# Patient Record
Sex: FEMALE | Race: WHITE | NOT HISPANIC OR LATINO | Employment: FULL TIME | URBAN - NONMETROPOLITAN AREA
[De-identification: names, ages, dates, MRNs, and addresses within clinical notes are randomized per-mention and may not be internally consistent; named-entity substitution may affect disease eponyms.]

---

## 2017-02-10 ENCOUNTER — COMMUNICATION - GICH (OUTPATIENT)
Dept: FAMILY MEDICINE | Facility: OTHER | Age: 62
End: 2017-02-10

## 2017-02-10 DIAGNOSIS — E78.00 PURE HYPERCHOLESTEROLEMIA: ICD-10-CM

## 2017-04-05 ENCOUNTER — OFFICE VISIT - GICH (OUTPATIENT)
Dept: FAMILY MEDICINE | Facility: OTHER | Age: 62
End: 2017-04-05

## 2017-04-05 ENCOUNTER — HOSPITAL ENCOUNTER (OUTPATIENT)
Dept: RADIOLOGY | Facility: OTHER | Age: 62
End: 2017-04-05
Attending: NURSE PRACTITIONER

## 2017-04-05 ENCOUNTER — HISTORY (OUTPATIENT)
Dept: FAMILY MEDICINE | Facility: OTHER | Age: 62
End: 2017-04-05

## 2017-04-05 DIAGNOSIS — M54.50 LOW BACK PAIN: ICD-10-CM

## 2017-04-05 DIAGNOSIS — M43.10 SPONDYLOLISTHESIS: ICD-10-CM

## 2017-04-05 DIAGNOSIS — M43.00 SPONDYLOLYSIS: ICD-10-CM

## 2017-04-05 DIAGNOSIS — M46.1 SACROILIITIS, NOT ELSEWHERE CLASSIFIED (H): ICD-10-CM

## 2017-04-20 ENCOUNTER — HISTORY (OUTPATIENT)
Dept: FAMILY MEDICINE | Facility: OTHER | Age: 62
End: 2017-04-20

## 2017-04-20 ENCOUNTER — OFFICE VISIT - GICH (OUTPATIENT)
Dept: FAMILY MEDICINE | Facility: OTHER | Age: 62
End: 2017-04-20

## 2017-04-20 DIAGNOSIS — E66.01 MORBID (SEVERE) OBESITY DUE TO EXCESS CALORIES (H): ICD-10-CM

## 2017-04-20 DIAGNOSIS — K64.5 PERIANAL VENOUS THROMBOSIS: ICD-10-CM

## 2017-04-20 DIAGNOSIS — Z12.11 ENCOUNTER FOR SCREENING FOR MALIGNANT NEOPLASM OF COLON: ICD-10-CM

## 2017-04-25 ENCOUNTER — COMMUNICATION - GICH (OUTPATIENT)
Dept: FAMILY MEDICINE | Facility: OTHER | Age: 62
End: 2017-04-25

## 2017-04-25 DIAGNOSIS — L30.9 DERMATITIS: ICD-10-CM

## 2017-04-27 ENCOUNTER — COMMUNICATION - GICH (OUTPATIENT)
Dept: FAMILY MEDICINE | Facility: OTHER | Age: 62
End: 2017-04-27

## 2017-05-24 ENCOUNTER — COMMUNICATION - GICH (OUTPATIENT)
Dept: SURGERY | Facility: OTHER | Age: 62
End: 2017-05-24

## 2017-07-03 ENCOUNTER — HISTORY (OUTPATIENT)
Dept: INTERNAL MEDICINE | Facility: OTHER | Age: 62
End: 2017-07-03

## 2017-07-03 ENCOUNTER — OFFICE VISIT - GICH (OUTPATIENT)
Dept: INTERNAL MEDICINE | Facility: OTHER | Age: 62
End: 2017-07-03

## 2017-07-03 DIAGNOSIS — M53.3 SACROCOCCYGEAL DISORDERS, NOT ELSEWHERE CLASSIFIED: ICD-10-CM

## 2017-07-03 DIAGNOSIS — G89.29 OTHER CHRONIC PAIN: ICD-10-CM

## 2017-07-03 DIAGNOSIS — M54.50 LOW BACK PAIN: ICD-10-CM

## 2017-07-04 ENCOUNTER — COMMUNICATION - GICH (OUTPATIENT)
Dept: FAMILY MEDICINE | Facility: OTHER | Age: 62
End: 2017-07-04

## 2017-07-04 DIAGNOSIS — N39.41 URGE INCONTINENCE: ICD-10-CM

## 2017-07-06 ENCOUNTER — COMMUNICATION - GICH (OUTPATIENT)
Dept: FAMILY MEDICINE | Facility: OTHER | Age: 62
End: 2017-07-06

## 2017-07-06 DIAGNOSIS — N39.41 URGE INCONTINENCE: ICD-10-CM

## 2017-07-08 ENCOUNTER — COMMUNICATION - GICH (OUTPATIENT)
Dept: FAMILY MEDICINE | Facility: OTHER | Age: 62
End: 2017-07-08

## 2017-07-08 DIAGNOSIS — N39.41 URGE INCONTINENCE: ICD-10-CM

## 2017-07-11 ENCOUNTER — COMMUNICATION - GICH (OUTPATIENT)
Dept: FAMILY MEDICINE | Facility: OTHER | Age: 62
End: 2017-07-11

## 2017-07-11 DIAGNOSIS — N39.41 URGE INCONTINENCE: ICD-10-CM

## 2017-07-26 ENCOUNTER — HISTORY (OUTPATIENT)
Dept: FAMILY MEDICINE | Facility: OTHER | Age: 62
End: 2017-07-26

## 2017-07-26 ENCOUNTER — OFFICE VISIT - GICH (OUTPATIENT)
Dept: FAMILY MEDICINE | Facility: OTHER | Age: 62
End: 2017-07-26

## 2017-07-26 DIAGNOSIS — L85.3 XEROSIS CUTIS: ICD-10-CM

## 2017-07-26 DIAGNOSIS — N39.41 URGE INCONTINENCE: ICD-10-CM

## 2017-07-26 DIAGNOSIS — E78.00 PURE HYPERCHOLESTEROLEMIA: ICD-10-CM

## 2017-07-26 DIAGNOSIS — Z12.39 ENCOUNTER FOR OTHER SCREENING FOR MALIGNANT NEOPLASM OF BREAST: ICD-10-CM

## 2017-07-26 LAB
A/G RATIO - HISTORICAL: 1.4 (ref 1–2)
ABSOLUTE BASOPHILS - HISTORICAL: 0 THOU/CU MM
ABSOLUTE EOSINOPHILS - HISTORICAL: 0.1 THOU/CU MM
ABSOLUTE IMMATURE GRANULOCYTES(METAS,MYELOS,PROS) - HISTORICAL: 0 THOU/CU MM
ABSOLUTE LYMPHOCYTES - HISTORICAL: 1.6 THOU/CU MM (ref 0.9–2.9)
ABSOLUTE MONOCYTES - HISTORICAL: 0.5 THOU/CU MM
ABSOLUTE NEUTROPHILS - HISTORICAL: 4.7 THOU/CU MM (ref 1.7–7)
ALBUMIN SERPL-MCNC: 4.2 G/DL (ref 3.5–5.7)
ALP SERPL-CCNC: 126 IU/L (ref 34–104)
ALT (SGPT) - HISTORICAL: 20 IU/L (ref 7–52)
ANION GAP - HISTORICAL: 8 (ref 5–18)
AST SERPL-CCNC: 22 IU/L (ref 13–39)
BASOPHILS # BLD AUTO: 0.6 %
BILIRUB SERPL-MCNC: 1.2 MG/DL (ref 0.3–1)
BILIRUB UR QL: NEGATIVE
BUN SERPL-MCNC: 13 MG/DL (ref 7–25)
BUN/CREAT RATIO - HISTORICAL: 17
CALCIUM SERPL-MCNC: 9.2 MG/DL (ref 8.6–10.3)
CHLORIDE SERPLBLD-SCNC: 107 MMOL/L (ref 98–107)
CHOL/HDL RATIO - HISTORICAL: 2.8
CHOLESTEROL TOTAL: 157 MG/DL
CLARITY, URINE: CLEAR CLARITY
CO2 SERPL-SCNC: 27 MMOL/L (ref 21–31)
COLOR UR: YELLOW COLOR
CREAT SERPL-MCNC: 0.77 MG/DL (ref 0.7–1.3)
EOSINOPHIL NFR BLD AUTO: 1.1 %
ERYTHROCYTE [DISTWIDTH] IN BLOOD BY AUTOMATED COUNT: 13.8 % (ref 11.5–15.5)
GFR IF NOT AFRICAN AMERICAN - HISTORICAL: >60 ML/MIN/1.73M2
GLOBULIN - HISTORICAL: 2.9 G/DL (ref 2–3.7)
GLUCOSE SERPL-MCNC: 104 MG/DL (ref 70–105)
GLUCOSE URINE: NEGATIVE MG/DL
HCT VFR BLD AUTO: 42.6 % (ref 33–51)
HDLC SERPL-MCNC: 56 MG/DL (ref 23–92)
HEMOGLOBIN: 14 G/DL (ref 12–16)
IMMATURE GRANULOCYTES(METAS,MYELOS,PROS) - HISTORICAL: 0.1 %
KETONES UR QL: NEGATIVE MG/DL
LDLC SERPL CALC-MCNC: 86 MG/DL
LEUKOCYTE ESTERASE URINE: NEGATIVE
LYMPHOCYTES NFR BLD AUTO: 22.9 % (ref 20–44)
MCH RBC QN AUTO: 28.5 PG (ref 26–34)
MCHC RBC AUTO-ENTMCNC: 32.9 G/DL (ref 32–36)
MCV RBC AUTO: 87 FL (ref 80–100)
MONOCYTES NFR BLD AUTO: 7.7 %
NEUTROPHILS NFR BLD AUTO: 67.6 % (ref 42–72)
NITRITE UR QL STRIP: NEGATIVE
NON-HDL CHOLESTEROL - HISTORICAL: 101 MG/DL
OCCULT BLOOD,URINE - HISTORICAL: NEGATIVE
PATIENT STATUS - HISTORICAL: NORMAL
PH UR: 7.5 [PH]
PLATELET # BLD AUTO: 243 THOU/CU MM (ref 140–440)
PMV BLD: 10.9 FL (ref 6.5–11)
POTASSIUM SERPL-SCNC: 3.9 MMOL/L (ref 3.5–5.1)
PROT SERPL-MCNC: 7.1 G/DL (ref 6.4–8.9)
PROTEIN QUALITATIVE,URINE - HISTORICAL: NEGATIVE MG/DL
RED BLOOD COUNT - HISTORICAL: 4.92 MIL/CU MM (ref 4–5.2)
SODIUM SERPL-SCNC: 142 MMOL/L (ref 133–143)
SP GR UR STRIP: 1.01
TRIGL SERPL-MCNC: 74 MG/DL
TSH - HISTORICAL: 3.09 UIU/ML (ref 0.34–5.6)
UROBILINOGEN,QUALITATIVE - HISTORICAL: NORMAL EU/DL
WHITE BLOOD COUNT - HISTORICAL: 7 THOU/CU MM (ref 4.5–11)

## 2017-07-26 ASSESSMENT — ANXIETY QUESTIONNAIRES
5. BEING SO RESTLESS THAT IT IS HARD TO SIT STILL: NOT AT ALL
3. WORRYING TOO MUCH ABOUT DIFFERENT THINGS: NOT AT ALL
GAD7 TOTAL SCORE: 1
4. TROUBLE RELAXING: NOT AT ALL
7. FEELING AFRAID AS IF SOMETHING AWFUL MIGHT HAPPEN: NOT AT ALL
2. NOT BEING ABLE TO STOP OR CONTROL WORRYING: SEVERAL DAYS
6. BECOMING EASILY ANNOYED OR IRRITABLE: NOT AT ALL
1. FEELING NERVOUS, ANXIOUS, OR ON EDGE: NOT AT ALL

## 2017-12-28 NOTE — TELEPHONE ENCOUNTER
Patient Information     Patient Name MRN Sex Lavonne Sullivan [id number] Female 1955      Telephone Encounter by Selina Minaya RN at 2017  8:57 AM     Author:  Selina Minaya RN Service:  (none) Author Type:  NURS- Registered Nurse     Filed:  2017  8:57 AM Encounter Date:  2017 Status:  Signed     :  Selina Minaya RN (NURS- Registered Nurse)            Request already responded to.    Selina Minaya RN ....................  2017   8:57 AM

## 2017-12-28 NOTE — TELEPHONE ENCOUNTER
Patient Information     Patient Name MRN Lavonne Nava [id number] Female 1955      Telephone Encounter by Pia Celeste RN at 2017 10:57 AM     Author:  Pia Celeste RN Service:  (none) Author Type:  NURS- Registered Nurse     Filed:  2017 10:57 AM Encounter Date:  2017 Status:  Signed     :  Pia Celeste RN (NURS- Registered Nurse)            Refilled 17 for 90 days.  Unable to complete prescription refill per RN Medication Refill Policy.................... PIA CELESTE RN ....................  2017   10:57 AM

## 2017-12-28 NOTE — TELEPHONE ENCOUNTER
Patient Information     Patient Name MRN Lavonne Nava [id number] Female 1955      Telephone Encounter by Pia Celeste RN at 2017  3:29 PM     Author:  Pia Celeste RN Service:  (none) Author Type:  NURS- Registered Nurse     Filed:  2017  3:30 PM Encounter Date:  2017 Status:  Signed     :  Pia Celeste RN (NURS- Registered Nurse)            Refilled 17, #90.  Unable to complete prescription refill per RN Medication Refill Policy.................... PIA CELESTE RN ....................  2017   3:30 PM

## 2017-12-28 NOTE — PROGRESS NOTES
"Patient Information     Patient Name MRN Lavonne Nava [id number] Female 1955      Progress Notes by Leslie Snow DO at 7/3/2017  2:40 PM     Author:  Leslie Snow DO Service:  (none) Author Type:  PHYS- Osteopathic     Filed:  7/3/2017  5:19 PM Encounter Date:  7/3/2017 Status:  Signed     :  Leslie Snow DO (PHYS- Osteopathic)            Chief Complaint     Patient presents with       Back Pain      left lower back pain x 1 week        Subjective:   Ms. Barrios is a 61 y.o. female  seen for the acute concern today of acute low back pain.  She states that this started one week ago.  She denies any acute trauma.  She was bending over for a while doing some work and noted when she stood up that she has significant discomfort along the left lumbar side.  She denies any significant sciatica.  She did have something similar a few months back and did have an x-ray.  We did review this together today.  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  Since she developed her back pain she has been putting some Biofreeze on it with minimal improvement.  She also has been taking some \"arthritis pills\"although is unsure what these are.  She reports that she is taking 2 of them every 4 hours.    She  reports that she has never smoked. She has never used smokeless tobacco.    Past medical history reviewed as below:     Past Medical History:     Diagnosis  Date     Impingement syndrome of right shoulder      Morbid obesity (HC)     BMI 42.5, based off height 67 inches, abdominal girth greater than 35. patient was counseled on obesity.       Skin rash     Recurrent      Thrombosed hemorrhoids    .      ROS:   Pertinent ROS was performed and was negative, including for fevers, chills, chest pain, shortness of breath, changes in bowel or bladder or new bowel or bladder incontinence. No other concerns, with exception of HPI above.      Objective:    /72  Pulse 64  Temp " "98.6  F (37  C) (Tympanic)  Resp 20  Ht 1.676 m (5' 6\")  Wt 127.5 kg (281 lb 2 oz)  Breastfeeding? No  BMI 45.37 kg/m2  GEN: Vitals reviewed.  Patient is in no acute distress. Cooperative with exam.  SKIN: Warm and dry to touch.  No rash on face, arms and legs.  EXT: No clubbing or cyanosis.  Trace bilateral lower ext peripheral edema.  MSK:  Gait is normal.  Patient is stiff with going from a sitting to standing position.  ROM of lumbar flexors and extensors is intact.  ROM with rotation and side bending is intact.  Pain to palpation of paraspinal muscles on left lumbar region.  No pain to palpation of spine directly.  BUE and BLE sensation is intact.  Notable muscle spasm and hypertrophy in the left lumbar paraspinal muscles.  BUE and BLE muscle strength intact.       Assessment/Plan:   1. Acute left-sided low back pain without sciatica  - Ice and/or heat, elevation, gentle movement/rest as tolerated  - Ibuprofen, Naproxen or Tylenol as needed  - exercises given, PT to be offered if she does not have improvement in the next week or two  - note given for work.  - If she does continue to have significant pain despite conservative treatment we may consider doing an MRI sooner than later due to her history of spondylolisthesis.  - patient is to call if she has additional problems with this or if new symptoms develop    2. Chronic SI joint pain  - exercises given specifically for SI joint pain.  We did discuss that if this continues we may need to consider injection.      Return if symptoms worsen or fail to improve.    Patient Instructions   Ice and/or heat every 3-4 hours, gentle exercise/rest as much as possible.    Recommend Ibuprofen 200 mg tablet (3 tablets) 3-4 times daily for 5-7 days and then as needed.  Be sure to take with food.  Maximum 16 per day.    Or     Naproxen (Aleve) 220 mg tablet (1-2 tablets) 1-2 times daily for 5-7 days and then as needed.  Be sure to take with food.  Max of 4 per " day.    Caution with NSAIDS (ibuprofen, aspirin, naproxen, aleve, advil) due to risk for increased blood pressure, stomach pain/nausea/ulcers and kidney damage; use minimal amount necessary    -- in addition to --     Tylenol 1000mg (2- extra strength 500mg tablets or 3 regular strength 325mg tablets) three times a day; Maximum of 4000mg daily    - Return/call as needed for follow-up should any new symptoms develop, for worsening of current symptoms or if symptoms do not resolve with above plan.       Index Romansh   Low Back Pain Exercises   Exercises that stretch and strengthen the muscles of your abdomen and spine can help prevent back problems. Strong back and abdominal muscles help you keep good posture, with your spine in its correct position.  If your muscles are tight, take a warm shower or bath before doing the exercises. Exercise on a rug or mat. Wear loose clothing. Don t wear shoes. Stop doing any exercise that causes pain until you have talked with your healthcare provider.  Ask your provider or physical therapist to help you develop an exercise program. Ask your provider how many times a week you need to do the exercises. Remember to start slowly.   Exercises  These exercises are intended only as suggestions. Be sure to check with your provider before starting the exercises.     Abdominal drawing-in maneuver: Lie on your back with your knees bent and your feet flat on the floor. Try to pull your belly button in towards your spine. Hold this position for 15 seconds and then relax. Repeat 5 to 10 times.    Cat and camel: Get down on your hands and knees. Let your stomach sag, allowing your back to curve downward. Hold this position for 5 seconds. Then arch your back and hold for 5 seconds. Do 2 sets of 15.    Quadruped arm and leg raise: Get down on your hands and knees. Pull in your belly button and tighten your abdominal muscles to stiffen your spine. While keeping your abdominals tight, raise one arm  and the opposite leg away from you. Hold this position for 5 seconds. Lower your arm and leg slowly and change sides. Do this 10 times on each side.    Pelvic tilt: Lie on your back with your knees bent and your feet flat on the floor. Pull your belly button in towards your spine and push your lower back into the floor, flattening your back. Hold this position for 15 seconds, then relax. Repeat 5 to 10 times.    Partial curl: Lie on your back with your knees bent and your feet flat on the floor. Draw in your abdomen and tighten your stomach muscles. With your hands stretched out in front of you, curl your upper body forward until your shoulders clear the floor. Hold this position for 3 seconds. Don't hold your breath. It helps to breathe out as you lift your shoulders. Relax back to the floor. Repeat 10 times. Build to 2 sets of 15. To challenge yourself, clasp your hands behind your head and keep your elbows out to your sides.    Gluteal stretch: Lie on your back with both knees bent. Rest your right ankle over the knee of your left leg. Grasp the thigh of the left leg and pull toward your chest. You will feel a stretch along the buttocks and possibly along the outside of your hip. Hold the stretch for 15 to 30 seconds. Then repeat the exercise with your left ankle over your right knee. Do the exercise 3 times with each leg.    Extension exercise  1. Lie face down on the floor for 5 minutes. If this hurts too much, lie face down with a pillow under your stomach. This should relieve your leg or back pain. When you can lie on your stomach for 5 minutes without a pillow, you can continue with Part B of this exercise.  2. After lying on your stomach for 5 minutes, prop yourself up on your elbows for another 5 minutes. If you can do this without having more leg or buttock pain, you can start doing part C of this exercise.  3. Lie on your stomach with your hands under your shoulders. Then press down on your hands and  extend your elbows while keeping your hips flat on the floor. Hold for 1 second and lower yourself to the floor. Do 3 to 5 sets of 10 repetitions. Rest for 1 minute between sets. You should have no pain in your legs when you do this, but it is normal to feel some pain in your lower back.  Do this exercise several times a day.    Side plank: Lie on your side with your legs, hips, and shoulders in a straight line. Prop yourself up onto your forearm with your elbow directly under your shoulder. Lift your hips off the floor and balance on your forearm and the outside of your foot. Try to hold this position for 15 seconds and then slowly lower your hip to the ground. Switch sides and repeat. Work up to holding for 1 minute. This exercise can be made easier by starting with your knees and hips flexed toward your chest.    Prone plank: Lie on your stomach on the floor with your elbows bent and your forearms resting on the floor. Lift your hips and knees off the floor and try to stay in this position while keeping your back flat. Work up to holding this position for at least 1 minute. Do 3 sets.  Exercises to avoid   It s best to avoid the following exercises because they strain the lower back:    Exercises in which you lie on your back and raise and lower both legs together    Full sit-ups or sit-ups with straight legs    Hip twists    Squats with weights  Sports and other activities   In addition to strengthening your back muscles, it s helpful to keep your entire body in shape. Good activities for people with back problems include:    Walking    Bicycling    Swimming    Cross-country skiing    Yoga    Dennis Chi    Pilates  Some sports can hurt your back because of rough contact, twisting, sudden impact, or direct stress on your back. Sports that may be dangerous to your back include:    Basketball    Football    Soccer    Volleyball    Handball    Golf    Weight  lifting    Trampoline    Tobogganing    Sledding    Snowmobiling    Snowboarding    Ice hockey  Developed by [employer].  Adult Advisor 2016.3 published by [employer].  Last modified: 2016-05-26  Last reviewed: 2016-05-18  This content is reviewed periodically and is subject to change as new health information becomes available. The information is intended to inform and educate and is not a replacement for medical evaluation, advice, diagnosis or treatment by a healthcare professional.  References   Adult Advisor 2016.3 Index    Copyright   2016 [employer], a division of [employer] All rights reserved.            Index Pakistani   Sacroiliac Joint Pain Exercises   Your healthcare provider may recommend exercises to help you heal. Talk to your healthcare provider or physical therapist about which exercises will best help you and how to do them correctly and safely.  These exercises are designed to gently move your sacroiliac joint. Do not do these exercises if they cause any pain or discomfort. If you keep having pain, see your healthcare provider or physical therapist as soon as possible.    Abdominal drawing-in maneuver: Lie on your back with your knees bent and your feet flat on the floor. Try to pull your belly button in towards your spine. Hold this position for 15 seconds and then relax. Repeat 5 to 10 times.    Hamstring stretch on wall: Lie on your back with your buttocks close to a doorway. Stretch your uninjured leg straight out in front of you on the floor through the doorway. Raise your injured leg and rest it against the wall next to the door frame. Keep your leg as straight as possible. You should feel a stretch in the back of your thigh. Hold this position for 15 to 30 seconds. Repeat 3 times.    Hip flexor stretch: Kneel on one leg with your other leg in front of you at a 90 degree angle (like a lunge). Keep that your foot flat on the floor. Keep your lower back straight and lean your  hips forward slightly until you feel a stretch at the front of your hip. Try not to bend forward as you do this. Hold this position for 15 to 30 seconds. Repeat 3 times with each leg.    Hip adductor stretch: Lie on your back. Bend your knees and put your feet flat on the floor. Gently spread your knees apart, stretching the muscles on the inside of your thighs. Hold the stretch for 15 to 30 seconds. Repeat 3 times.    Isometric hip adduction: Sit with your knees bent 90 degrees with a pillow placed between your knees and your feet flat on the floor. Squeeze the pillow for 5 seconds and then relax. Do 2 sets of 10.    Gluteal Sets: Lie on your stomach with your legs straight out behind you. Squeeze your buttock muscles together and hold for 5 seconds. Relax. Do 2 sets of 10.    Lower trunk rotation: Lie on your back with your knees bent and your feet flat on the floor. Tighten your stomach muscles and push your lower back into the floor. Keeping your shoulders down flat, gently rotate your legs to one side as far as you can. Then rotate your legs to the other side. Repeat 10 to 20 times.    Single knee to chest stretch: Lie on your back with your legs straight out in front of you. Bring one knee up to your chest and grasp the back of your thigh. Pull your knee toward your chest, stretching your buttock muscle. Hold this position for 15 to 30 seconds and then return to the starting position. Repeat 3 times on each side.    Double knee to chest: Lie on your back with your knees bent and your feet flat on the floor. Tighten your stomach muscles and push your lower back into the floor. Pull both knees up to your chest. Hold for 5 seconds. Relax and then repeat 3 times.    Resisted hip extension: Stand facing a door with elastic tubing tied around the ankle of your injured side. Knot the other end of the tubing and shut the knot in the door near the floor. Draw your abdomen in towards your spine and tighten your  abdominal muscles. Pull the leg with the tubing straight back, keeping your leg straight. Make sure you do not lean forward. Return to the starting position. Do 2 sets of 10.    Clam exercise: Lie on your uninjured side with your hips and knees bent and feet together. Slowly raise your top leg toward the ceiling while keeping your heels touching each other. Hold for 2 seconds and lower slowly. Do 2 sets of 15 repetitions.  Developed by [employer].  Adult Advisor 2016.3 published by [employer].  Last modified: 2016-04-25  Last reviewed: 2016-04-25  This content is reviewed periodically and is subject to change as new health information becomes available. The information is intended to inform and educate and is not a replacement for medical evaluation, advice, diagnosis or treatment by a healthcare professional.  References   Adult Advisor 2016.3 Index    Copyright   2016 [employer], a division of [employer] All rights reserved.              NAHED PERDOMO DO   7/3/2017 5:13 PM    This document was prepared using voice generated softwear. While every attempt was made for accuracy, grammatical errors may exist.

## 2017-12-28 NOTE — PROGRESS NOTES
Patient Information     Patient Name MRN Lavonne Nava [id number] Female 1955      Progress Notes by Sarah Albright MD at 2017  9:30 AM     Author:  Sarah Albright MD Service:  (none) Author Type:  Physician     Filed:  2017  8:54 PM Encounter Date:  2017 Status:  Signed     :  Sarah Albright MD (Physician)            Nursing Notes:   Juancarlos Navarrete LPN  2017  9:33 AM  Signed  Patient presents to the clinic for  medication management.  Juancarlos Navarrete LPN ..............2017 9:33 AM         Subjective:  Lavonne Barrios is a 61 y.o. female who presents for  Multiple issues     URINARY INCONTINENCE, URGE   patient with a history of mixed incontinence.  More stress but does say that pop causes bladder urgency.  When she takes ditropan it is improved.  She continues to use bladder irritants.   She denies any fever, chills.  No blood in urine       Pure hypercholesterolemia   on Lipitor 80 mg a day.  She is having fatigue and muscle aches.   She denies any chest pain, palpitations, she has edema by the end of the day where she can see creases from her socks.          Screening for breast cancer  Patient declines breast exam.  She is willing to have mammogram.          Dry skin  She notes her skin, hair and nails are dry.  She is under a lot of stress.   She has family members with thyroid problems.   - TSH; Future  - TSH    Situational stress  Patient cares for her daughter ( late 30's) with MS.  She struggles with finding life balance as well as worries about her all the time, especially ' once I'm gone. '.       PHQ Depression Screen  Date of PHQ exam: 17  Over the last 2 weeks, how often have you been bothered by any of the following problems?  1. Little interest or pleasure in doing things: 0 - Not at all  2. Feeling down, depressed, or hopeless: 0 - Not at all                                         No Known Allergies  No  "current outpatient prescriptions on file prior to visit.     No current facility-administered medications on file prior to visit.        Problem List/PMH: reviewed in EMR    Social Hx:  Social History       Substance Use Topics         Smoking status:   Never Smoker     Smokeless tobacco:   Never Used     Alcohol use   No      Comment: [facility]      Social History Narrative    . nonsmoker.        Family Hx:   Family History       Problem   Relation Age of Onset     Heart Disease  Mother      Cancer  Father      Melanoma, family hx       Cancer-pancreatic  Other      Family hx       Other  Daughter      MS       Cancer-breast  No Family History        Objective:  /80  Pulse 56  Ht 1.676 m (5' 6\")  Wt 128.8 kg (284 lb)  BMI 45.84 kg/m2   Patient is alert and oriented times three.  She is mood is sad, slightly depressed.  Affect congruent.  She has good judgement and insight. Casually groomed.  Obese.  Patient appears tired.   SHEREE. TM's clear, Oral pharynx with good dentition, without lesion, erythema or exudate. Moist mucous membranes. Neck supple and free of adenopathy, or masses. No thyromegaly. Lungs are clear, without wheezes, rhonchi or rales. Heart sounds are normal, no murmurs, clicks, gallops or rubs. Abdomen is soft, obese no tenderness, masses or organomegaly. No CVAT.  Extremities are without edema. Peripheral pulses are normal. Screening neurological exam is normal without focal findings. Skin is dry.       Results for orders placed or performed in visit on 07/26/17      COMP METABOLIC PANEL      Result  Value Ref Range    SODIUM 142 133 - 143 mmol/L    POTASSIUM 3.9 3.5 - 5.1 mmol/L    CHLORIDE 107 98 - 107 mmol/L    CO2,TOTAL 27 21 - 31 mmol/L    ANION GAP 8 5 - 18                    GLUCOSE 104 70 - 105 mg/dL    CALCIUM 9.2 8.6 - 10.3 mg/dL    BUN 13 7 - 25 mg/dL    CREATININE 0.77 0.70 - 1.30 mg/dL    BUN/CREAT RATIO           17                    GFR if  >60 >60 " ml/min/1.73m2    GFR if not African American >60 >60 ml/min/1.73m2    ALBUMIN 4.2 3.5 - 5.7 g/dL    PROTEIN,TOTAL 7.1 6.4 - 8.9 g/dL    GLOBULIN                  2.9 2.0 - 3.7 g/dL    A/G RATIO 1.4 1.0 - 2.0                    BILIRUBIN,TOTAL 1.2 (H) 0.3 - 1.0 mg/dL    ALK PHOSPHATASE 126 (H) 34 - 104 IU/L    ALT (SGPT) 20 7 - 52 IU/L    AST (SGOT) 22 13 - 39 IU/L   TSH      Result  Value Ref Range    TSH 3.09 0.34 - 5.60 uIU/mL   CBC WITH AUTO DIFFERENTIAL      Result  Value Ref Range    WHITE BLOOD COUNT         7.0 4.5 - 11.0 thou/cu mm    RED BLOOD COUNT           4.92 4.00 - 5.20 mil/cu mm    HEMOGLOBIN                14.0 12.0 - 16.0 g/dL    HEMATOCRIT                42.6 33.0 - 51.0 %    MCV                       87 80 - 100 fL    MCH                       28.5 26.0 - 34.0 pg    MCHC                      32.9 32.0 - 36.0 g/dL    RDW                       13.8 11.5 - 15.5 %    PLATELET COUNT            243 140 - 440 thou/cu mm    MPV                       10.9 6.5 - 11.0 fL    NEUTROPHILS               67.6 42.0 - 72.0 %    LYMPHOCYTES               22.9 20.0 - 44.0 %    MONOCYTES                 7.7 <12.0 %    EOSINOPHILS               1.1 <8.0 %    BASOPHILS                 0.6 <3.0 %    IMMATURE GRANULOCYTES(METAS,MYELOS,PROS) 0.1 %    ABSOLUTE NEUTROPHILS      4.7 1.7 - 7.0 thou/cu mm    ABSOLUTE LYMPHOCYTES      1.6 0.9 - 2.9 thou/cu mm    ABSOLUTE MONOCYTES        0.5 <0.9 thou/cu mm    ABSOLUTE EOSINOPHILS      0.1 <0.5 thou/cu mm    ABSOLUTE BASOPHILS        0.0 <0.3 thou/cu mm    ABSOLUTE IMMATURE GRANULOCYTES(METAS,MYELOS,PROS) 0.0 <=0.3 thou/cu mm   URINALYSIS W REFLEX MICROSCOPIC IF POSITIVE      Result  Value Ref Range    COLOR                     Yellow Yellow Color    CLARITY                   Clear Clear Clarity    SPECIFIC GRAVITY,URINE    1.015 1.010, 1.015, 1.020, 1.025                    PH,URINE                  7.5 6.0, 7.0, 8.0, 5.5, 6.5, 7.5, 8.5                     UROBILINOGEN,QUALITATIVE  Normal Normal EU/dl    PROTEIN, URINE Negative Negative mg/dL    GLUCOSE, URINE Negative Negative mg/dL    KETONES,URINE             Negative Negative mg/dL    BILIRUBIN,URINE           Negative Negative                    OCCULT BLOOD,URINE        Negative Negative                    NITRITE                   Negative Negative                    LEUKOCYTE ESTERASE        Negative Negative                   LIPID PANEL      Result  Value Ref Range    CHOLESTEROL,TOTAL 157 <200 mg/dL    TRIGLYCERIDES 74 <150 mg/dL    HDL CHOLESTEROL 56 23 - 92 mg/dL    NON-HDL CHOLESTEROL 101 <145 mg/dl    CHOL/HDL RATIO            2.80 <4.50                    LDL CHOLESTEROL 86 <100 mg/dL    PATIENT STATUS            FASTING                         Assessment:    ICD-10-CM    1. Pure hypercholesterolemia E78.00 atorvastatin (LIPITOR) 80 mg tablet      LIPID PANEL      COMP METABOLIC PANEL      CBC AND DIFFERENTIAL      COMP METABOLIC PANEL      CBC AND DIFFERENTIAL      CBC WITH AUTO DIFFERENTIAL      LIPID PANEL   2. URINARY INCONTINENCE, URGE N39.41 oxybutynin XL (DITROPAN XL) 5 mg CR tablet      URINALYSIS W REFLEX MICROSCOPIC IF POSITIVE      URINALYSIS W REFLEX MICROSCOPIC IF POSITIVE   3. Screening for breast cancer Z12.39 XR MAMMO BILATSCREENING   4. Dry skin L85.3 TSH      TSH        Ms. Barrios's Body mass index is 45.84 kg/(m^2). This is out of the normal range for a 61 y.o. Normal range for ages 18+ is between 18.5 and 24.9. To lose weight we reviewed risks and benefits of appropriate options such as diet, exercise, and medications. Patient's strategy will be  self-directed nutrition plan and self-directed exercise program     Encouraged counseling, crisis team phone number provided, names of local psychologists provided,  nutritious eating, behavior acivation and 30 minutes of exercise.   Patient did contract for life. Has support of family and friends  Discussed relaxation techniques.         I  "spent approximately 30 minutes with the patient (exclusive of separately billed services/procedures), with greater than 50% spent in counseling, prognosis, risks and benefits of management or follow-up.  Reviewed importance of compliance with chosen treatment options and follow-up.  Risk factor reduction and patient education and coordinating care, establishing and/or reviewing the patient's medical record also completed during today's exam .          Plan:   -- Expected clinical course discussed   -- Medications and their side effects discussed  Patient Instructions   Weight Loss Strategies  1. Eat at least 3 meals a day and never skip breakfast.  2. Eat more slowly.  3. Decrease portion size.  4. Provide structure by using meal replacement bars or shakes, and/or low calorie frozen meals.  5. For good nutrition incorporate fruit, vegetables, whole grains, lean protein, and low-fat dairy.  6. Remove trigger foods from your environment to avoid impulse eating.  7. Increase physical activity: get a pedometer and aim for 10,000 steps a day or 30-35 minutes of activity 5 days per week.  8. Weigh yourself daily or at least weekly.  9. Keep a record of what you eat and your activity.  10. Establish a support system such as a friend, group or program.  11.  Read Fer Velez's \"Eat to Live\"   12.   Remember it is important to have a minimum of 1400 calories a day, okay to use olive oil, 40 grams of fiber daily.  No more than two servings ( the size of your palm) of red meat a week.     13.   Live to Eat cookbook     Restart vitamin 1000 IU     lipitor refilled     Bladder irritants include janette, limes, caffeine, carbonated beverages and alcohol.  Try to reduce these items to reduce trips to bathroom.  Consider 'timed' voiding every 2 hours.   Work on Kegel exercises.  5 times every time the phone rings.       Handouts on spondylolysis and spondylolisthesis with exercises provided.  Offered PT but she declined at this " time due to costs.  , thyroid disorders handouts  provided       Electronically signed by Sarah Albright MD

## 2017-12-28 NOTE — TELEPHONE ENCOUNTER
Patient Information     Patient Name MRN Sex Lavonne Sullivan [id number] Female 1955      Telephone Encounter by Selina Minaya RN at 2017  8:17 AM     Author:  Selina Minaya RN Service:  (none) Author Type:  NURS- Registered Nurse     Filed:  2017  8:20 AM Encounter Date:  2017 Status:  Signed     :  Selina Minaya RN (NURS- Registered Nurse)            Office visit in the past 12 months or per provider note.    Last visit with LADARIUS SANCHES was on: 2017 in [facility]  Next visit with LADARIUS SANCHES is on: No future appointment listed with this provider  Next visit with Family Practice is on: No future appointment listed in this department    Max refill for 12 months from last office visit or per provider note.    Patient is due for medication management appointment. Limited refill provided at this time. [facility] message and/or letter sent for reminder to patient. Prescription refilled per RN Medication Refill Policy.................... Selina Minaya RN ....................  2017   8:18 AM

## 2017-12-29 NOTE — PATIENT INSTRUCTIONS
Patient Information     Patient Name MRN Sex Lavonne Sullivan [id number] Female 1955      Patient Instructions by Leslie Snow DO at 7/3/2017  2:40 PM     Author:  Leslie Snow DO Service:  (none) Author Type:  PHYS- Osteopathic     Filed:  7/3/2017  3:12 PM Encounter Date:  7/3/2017 Status:  Signed     :  Leslie Snow DO (PHYS- Osteopathic)            Ice and/or heat every 3-4 hours, gentle exercise/rest as much as possible.    Recommend Ibuprofen 200 mg tablet (3 tablets) 3-4 times daily for 5-7 days and then as needed.  Be sure to take with food.  Maximum 16 per day.    Or     Naproxen (Aleve) 220 mg tablet (1-2 tablets) 1-2 times daily for 5-7 days and then as needed.  Be sure to take with food.  Max of 4 per day.    Caution with NSAIDS (ibuprofen, aspirin, naproxen, aleve, advil) due to risk for increased blood pressure, stomach pain/nausea/ulcers and kidney damage; use minimal amount necessary    -- in addition to --     Tylenol 1000mg (2- extra strength 500mg tablets or 3 regular strength 325mg tablets) three times a day; Maximum of 4000mg daily    - Return/call as needed for follow-up should any new symptoms develop, for worsening of current symptoms or if symptoms do not resolve with above plan.       Index [place]   Low Back Pain Exercises   Exercises that stretch and strengthen the muscles of your abdomen and spine can help prevent back problems. Strong back and abdominal muscles help you keep good posture, with your spine in its correct position.  If your muscles are tight, take a warm shower or bath before doing the exercises. Exercise on a rug or mat. Wear loose clothing. Don t wear shoes. Stop doing any exercise that causes pain until you have talked with your healthcare provider.  Ask your provider or physical therapist to help you develop an exercise program. Ask your provider how many times a week you need to do the exercises. Remember to start slowly.   Exercises  These  exercises are intended only as suggestions. Be sure to check with your provider before starting the exercises.     Abdominal drawing-in maneuver: Lie on your back with your knees bent and your feet flat on the floor. Try to pull your belly button in towards your spine. Hold this position for 15 seconds and then relax. Repeat 5 to 10 times.    Cat and camel: Get down on your hands and knees. Let your stomach sag, allowing your back to curve downward. Hold this position for 5 seconds. Then arch your back and hold for 5 seconds. Do 2 sets of 15.    Quadruped arm and leg raise: Get down on your hands and knees. Pull in your belly button and tighten your abdominal muscles to stiffen your spine. While keeping your abdominals tight, raise one arm and the opposite leg away from you. Hold this position for 5 seconds. Lower your arm and leg slowly and change sides. Do this 10 times on each side.    Pelvic tilt: Lie on your back with your knees bent and your feet flat on the floor. Pull your belly button in towards your spine and push your lower back into the floor, flattening your back. Hold this position for 15 seconds, then relax. Repeat 5 to 10 times.    Partial curl: Lie on your back with your knees bent and your feet flat on the floor. Draw in your abdomen and tighten your stomach muscles. With your hands stretched out in front of you, curl your upper body forward until your shoulders clear the floor. Hold this position for 3 seconds. Don't hold your breath. It helps to breathe out as you lift your shoulders. Relax back to the floor. Repeat 10 times. Build to 2 sets of 15. To challenge yourself, clasp your hands behind your head and keep your elbows out to your sides.    Gluteal stretch: Lie on your back with both knees bent. Rest your right ankle over the knee of your left leg. Grasp the thigh of the left leg and pull toward your chest. You will feel a stretch along the buttocks and possibly along the outside of your  hip. Hold the stretch for 15 to 30 seconds. Then repeat the exercise with your left ankle over your right knee. Do the exercise 3 times with each leg.    Extension exercise  1. Lie face down on the floor for 5 minutes. If this hurts too much, lie face down with a pillow under your stomach. This should relieve your leg or back pain. When you can lie on your stomach for 5 minutes without a pillow, you can continue with Part B of this exercise.  2. After lying on your stomach for 5 minutes, prop yourself up on your elbows for another 5 minutes. If you can do this without having more leg or buttock pain, you can start doing part C of this exercise.  3. Lie on your stomach with your hands under your shoulders. Then press down on your hands and extend your elbows while keeping your hips flat on the floor. Hold for 1 second and lower yourself to the floor. Do 3 to 5 sets of 10 repetitions. Rest for 1 minute between sets. You should have no pain in your legs when you do this, but it is normal to feel some pain in your lower back.  Do this exercise several times a day.    Side plank: Lie on your side with your legs, hips, and shoulders in a straight line. Prop yourself up onto your forearm with your elbow directly under your shoulder. Lift your hips off the floor and balance on your forearm and the outside of your foot. Try to hold this position for 15 seconds and then slowly lower your hip to the ground. Switch sides and repeat. Work up to holding for 1 minute. This exercise can be made easier by starting with your knees and hips flexed toward your chest.    Prone plank: Lie on your stomach on the floor with your elbows bent and your forearms resting on the floor. Lift your hips and knees off the floor and try to stay in this position while keeping your back flat. Work up to holding this position for at least 1 minute. Do 3 sets.  Exercises to avoid   It s best to avoid the following exercises because they strain the lower  back:    Exercises in which you lie on your back and raise and lower both legs together    Full sit-ups or sit-ups with straight legs    Hip twists    Squats with weights  Sports and other activities   In addition to strengthening your back muscles, it s helpful to keep your entire body in shape. Good activities for people with back problems include:    Walking    Bicycling    Swimming    Cross-country skiing    Yoga    Dennis Chi    Pilates  Some sports can hurt your back because of rough contact, twisting, sudden impact, or direct stress on your back. Sports that may be dangerous to your back include:    Basketball    Football    Soccer    Volleyball    Handball    Golf    Weight lifting    Trampoline    Tobogganing    Sledding    Snowmobiling    Snowboarding    Ice hockey  Developed by [employer].  Adult Advisor 2016.3 published by [employer].  Last modified: 2016-05-26  Last reviewed: 2016-05-18  This content is reviewed periodically and is subject to change as new health information becomes available. The information is intended to inform and educate and is not a replacement for medical evaluation, advice, diagnosis or treatment by a healthcare professional.  References   Adult Advisor 2016.3 Index    Copyright   2016 [employer], a division of [employer] All rights reserved.            Index [place]   Sacroiliac Joint Pain Exercises   Your healthcare provider may recommend exercises to help you heal. Talk to your healthcare provider or physical therapist about which exercises will best help you and how to do them correctly and safely.  These exercises are designed to gently move your sacroiliac joint. Do not do these exercises if they cause any pain or discomfort. If you keep having pain, see your healthcare provider or physical therapist as soon as possible.    Abdominal drawing-in maneuver: Lie on your back with your knees bent and your feet flat on the floor. Try to pull your belly button in  towards your spine. Hold this position for 15 seconds and then relax. Repeat 5 to 10 times.    Hamstring stretch on wall: Lie on your back with your buttocks close to a doorway. Stretch your uninjured leg straight out in front of you on the floor through the doorway. Raise your injured leg and rest it against the wall next to the door frame. Keep your leg as straight as possible. You should feel a stretch in the back of your thigh. Hold this position for 15 to 30 seconds. Repeat 3 times.    Hip flexor stretch: Kneel on one leg with your other leg in front of you at a 90 degree angle (like a lunge). Keep that your foot flat on the floor. Keep your lower back straight and lean your hips forward slightly until you feel a stretch at the front of your hip. Try not to bend forward as you do this. Hold this position for 15 to 30 seconds. Repeat 3 times with each leg.    Hip adductor stretch: Lie on your back. Bend your knees and put your feet flat on the floor. Gently spread your knees apart, stretching the muscles on the inside of your thighs. Hold the stretch for 15 to 30 seconds. Repeat 3 times.    Isometric hip adduction: Sit with your knees bent 90 degrees with a pillow placed between your knees and your feet flat on the floor. Squeeze the pillow for 5 seconds and then relax. Do 2 sets of 10.    Gluteal Sets: Lie on your stomach with your legs straight out behind you. Squeeze your buttock muscles together and hold for 5 seconds. Relax. Do 2 sets of 10.    Lower trunk rotation: Lie on your back with your knees bent and your feet flat on the floor. Tighten your stomach muscles and push your lower back into the floor. Keeping your shoulders down flat, gently rotate your legs to one side as far as you can. Then rotate your legs to the other side. Repeat 10 to 20 times.    Single knee to chest stretch: Lie on your back with your legs straight out in front of you. Bring one knee up to your chest and grasp the back of your  thigh. Pull your knee toward your chest, stretching your buttock muscle. Hold this position for 15 to 30 seconds and then return to the starting position. Repeat 3 times on each side.    Double knee to chest: Lie on your back with your knees bent and your feet flat on the floor. Tighten your stomach muscles and push your lower back into the floor. Pull both knees up to your chest. Hold for 5 seconds. Relax and then repeat 3 times.    Resisted hip extension: Stand facing a door with elastic tubing tied around the ankle of your injured side. Knot the other end of the tubing and shut the knot in the door near the floor. Draw your abdomen in towards your spine and tighten your abdominal muscles. Pull the leg with the tubing straight back, keeping your leg straight. Make sure you do not lean forward. Return to the starting position. Do 2 sets of 10.    Clam exercise: Lie on your uninjured side with your hips and knees bent and feet together. Slowly raise your top leg toward the ceiling while keeping your heels touching each other. Hold for 2 seconds and lower slowly. Do 2 sets of 15 repetitions.  Developed by [employer].  Adult Advisor 2016.3 published by [employer].  Last modified: 2016-04-25  Last reviewed: 2016-04-25  This content is reviewed periodically and is subject to change as new health information becomes available. The information is intended to inform and educate and is not a replacement for medical evaluation, advice, diagnosis or treatment by a healthcare professional.  References   Adult Advisor 2016.3 Index    Copyright   2016 [employer], a division of [employer] All rights reserved.

## 2017-12-29 NOTE — PATIENT INSTRUCTIONS
"Patient Information     Patient Name MRN Sex Lavonne Sullivan [id number] Female 1955      Patient Instructions by Sarah Albright MD at 2017 10:40 AM     Author:  Sarah Albright MD  Service:  (none) Author Type:  Physician     Filed:  2017  8:54 PM  Encounter Date:  2017 Status:  Addendum     :  Sarah Albright MD (Physician)        Related Notes: Original Note by Sarah Albright MD (Physician) filed at 2017  8:41 PM            Weight Loss Strategies  1. Eat at least 3 meals a day and never skip breakfast.  2. Eat more slowly.  3. Decrease portion size.  4. Provide structure by using meal replacement bars or shakes, and/or low calorie frozen meals.  5. For good nutrition incorporate fruit, vegetables, whole grains, lean protein, and low-fat dairy.  6. Remove trigger foods from your environment to avoid impulse eating.  7. Increase physical activity: get a pedometer and aim for 10,000 steps a day or 30-35 minutes of activity 5 days per week.  8. Weigh yourself daily or at least weekly.  9. Keep a record of what you eat and your activity.  10. Establish a support system such as a friend, group or program.  11.  Read Fer Velez's \"Eat to Live\"   12.   Remember it is important to have a minimum of 1400 calories a day, okay to use olive oil, 40 grams of fiber daily.  No more than two servings ( the size of your palm) of red meat a week.     13.   Live to Eat cookbook     Restart vitamin 1000 IU     lipitor refilled     Bladder irritants include janette, limes, caffeine, carbonated beverages and alcohol.  Try to reduce these items to reduce trips to bathroom.  Consider 'timed' voiding every 2 hours.   Work on Kegel exercises.  5 times every time the phone rings.       Handouts on spondylolysis and spondylolisthesis with exercises provided.  Offered PT but she declined at this time due to costs.  , thyroid disorders handouts  provided         "

## 2017-12-30 NOTE — NURSING NOTE
Patient Information     Patient Name MRN Sex Lavonne Sullivan [id number] Female 1955      Nursing Note by Selina Meza at 7/3/2017  2:40 PM     Author:  Selina Meza Service:  (none) Author Type:  (none)     Filed:  7/3/2017  2:47 PM Encounter Date:  7/3/2017 Status:  Signed     :  Selina Meza            Patient presents to clinic experiencing lower left side back pain for past week.    Selina Meza LPN..................7/3/2017  2:43 PM

## 2017-12-30 NOTE — NURSING NOTE
Patient Information     Patient Name MRN Sex Lavonne Sullivan [id number] Female 1955      Nursing Note by Juancarlos Navarrete LPN at 2017  9:30 AM     Author:  Juancarlos Navarrete LPN Service:  (none) Author Type:  NURS- Licensed Practical Nurse     Filed:  2017  9:33 AM Encounter Date:  2017 Status:  Signed     :  Juancarlos Navarrete LPN (NURS- Licensed Practical Nurse)            Patient presents to the clinic for  medication management.  Juancarlos Navarrete LPN ..............2017 9:33 AM

## 2018-01-03 NOTE — TELEPHONE ENCOUNTER
Patient Information     Patient Name MRN Sex Lavonne Sullivan [id number] Female 1955      Telephone Encounter by Tarah Guerrier at 2/10/2017  9:37 AM     Author:  Tarah Guerrier Service:  (none) Author Type:  NURS- Registered Nurse     Filed:  2/10/2017  9:39 AM Encounter Date:  2/10/2017 Status:  Signed     :  Tarah Guerrier (NURS- Registered Nurse)            Statins    Office visit in the past 12 months.    Last visit with LADARIUS SANCHES was on: 2016 in [facility]  Next visit with LADARIUS SANCHES is on: No future appointment listed with this provider  Next visit with Family Practice is on: No future appointment listed in this department    Last Lipids:  Chol: 200    2016  T    2016  HDL:   60    2016  LDL:  125    2016  LDL DIRECT:  No results found in past 5 years    .    Max refills 12 months from last office visit.        Prescription refilled per RN Medication Refill Policy.................... Tarah Guerrier RN ....................  2/10/2017   9:39 AM

## 2018-01-04 NOTE — PROGRESS NOTES
"Patient Information     Patient Name MRN Sex Lavonne Sullivan [id number] Female 1955      Progress Notes by Sarah Albright MD at 2017  3:45 PM     Author:  Sarah Albright MD Service:  (none) Author Type:  Physician     Filed:  2017  5:37 PM Encounter Date:  2017 Status:  Signed     :  Sarah Albright MD (Physician)            Nursing Notes:   Liliana Rodriguez BAY  2017  4:01 PM  Signed  Patient states she has a lump near rectum which has been there for a few weeks, hemorrhoid cream isn't helping, denies bleeding or pain. She has a question regarding her pinky finger on L hand. Would like a Tetanus shot and would like to talk with  Regarding the Shingles vaccine. Liliana Rodriguez LPN......................2017 3:55 PM         Subjective:  Lavonne Brarios is a 61 y.o. female who presents for concern  For rectal lump    Patient is a pleasant 61-year-old white female who comes in today complaining of possible rectal mass. She notes that she noticed it approximately 3 weeks ago. Her last colonoscopy was 10 years ago and she is due for screening. She denies any blood in her stools. She had been constipated thought it was a hemorrhoid but it never\" ruptured\". It is not painful. Just wants to make sure that it's nothing.      Situational depression  patient's daughter has MS. She's not compliant with therapies. She is in need of seen a neurologist. Patient becomes tearful when discussing this as she is afraid of what would happen to her daughter upon her death. She spends 10-15 minutes telemetry personal information    PHQ Depression Screen  Date of PHQ exam: 17  Over the last 2 weeks, how often have you been bothered by any of the following problems?  1. Little interest or pleasure in doing things: 0 - Not at all  2. Feeling down, depressed, or hopeless: 0 - Not at all              No Known Allergies  Current Outpatient Prescriptions on File Prior to " "Visit       Medication  Sig Dispense Refill     atorvastatin (LIPITOR) 80 mg tablet TAKE ONE TABLET BY MOUTH ONCE DAILY 90 tablet 1     oxybutynin XL (DITROPAN XL) 5 mg CR tablet Take 1 tablet by mouth once daily. 90 tablet 2     No current facility-administered medications on file prior to visit.        Problem List/PMH: reviewed in EMR    Social Hx:  Social History       Substance Use Topics         Smoking status:   Never Smoker     Smokeless tobacco:   Never Used     Alcohol use   No      Comment: [facility]      Social History Narrative    . nonsmoker.        Family Hx:   Family History       Problem   Relation Age of Onset     Heart Disease  Mother      Cancer  Father      Melanoma, family hx       Cancer-pancreatic  Other      Family hx       Other  Daughter      MS       Cancer-breast  No Family History        Objective:  /82  Pulse 60  Ht 1.676 m (5' 6\")  Wt 127.9 kg (282 lb)  Breastfeeding? No  BMI 45.52 kg/m2    patient is alert oriented ×3 no apparent distress PERRLA EOMI moist mucous membranes. Abdomen is obese soft nontender. Rectal exam reveals partially thrombosed hemorrhoid measuring approximately one and half centimeters. I can partially compress it but not completely. Causes patient minimal discomfort. She does not have any significant fissures. She does have internal hemorrhoids noted.       Assessment:    ICD-10-CM    1. Screening for colon cancer Z12.11 COLONOSCOPY SCREENING   2. Morbid obesity with BMI of 45.0-49.9, adult (HC) E66.01      Z68.42    3. Hemorrhoid thrombosis K64.5 hydrocortisone (ANUSOL-HC  SUPPOSITORY) 25 mg suppository        Ms. Choudhurys Body mass index is 45.52 kg/(m^2). This is out of the normal range for a 61 y.o. Normal range for ages 18-64 is between 18.5 and 24.9; normal range for ages 65+ is 23-30. To lose weight we reviewed risks and benefits of appropriate options such as diet, exercise, and medications. Patient's strategy will be  self-directed " nutrition plan and self-directed exercise program    Encouraged relaxation techniques.       Plan:   -- Expected clinical course discussed   -- Medications and their side effects discussed  Patient Instructions   Hydrocortisone suppositories twice a day 3-5 days  increase fiber intake to 40 g a day  May use Nupercainal over-the-counter when necessary discomfort  referral to Dr. Lovett for colonoscopy    Weight Loss Strategies  1. Eat at least 3 meals a day and never skip breakfast.  2. Eat more slowly.  3. Decrease portion size.  4. Provide structure by using meal replacement bars or shakes, and/or low calorie frozen meals.  5. For good nutrition incorporate fruit, vegetables, whole grains, lean protein, and low-fat dairy.  6. Remove trigger foods from your environment to avoid impulse eating.  7. Increase physical activity: get a pedometer and aim for 10,000 steps a day or 30-35 minutes of activity 5 days per week.  8. Weigh yourself daily or at least weekly.  9. Keep a record of what you eat and your activity.  10. Establish a support system such as a friend, group or program.         Index Turkish All languages Related topics   Colonoscopy   ________________________________________________________________________  KEY POINTS    A colonoscopy is an exam of your large intestine, also called the colon, with a thin, flexible, lighted tube and tiny camera. This scope is put through your rectum and into your large intestine.    A colonoscopy is used to check for growths or cancer, or to find the cause of symptoms such as diarrhea, rectal bleeding, or other problems in your intestines.    You will be given instructions for clearing bowel movements from your intestines. Be sure to complete the bowel preparation as instructed, including what types of food and drink you can have in the days leading up to the procedure.  ________________________________________________________________________  What is a colonoscopy?  A  colonoscopy is an exam of your large intestine, also called the colon, with a thin, flexible, lighted tube and tiny camera. This scope is put through your rectum and into your large intestine.  When is it used?  Colonoscopy is the most direct and complete way to check the entire lining of the colon. It is usually done for one of the following reasons:    Prevention and early detection of cancer. A colonoscopy can help your healthcare provider find growths (polyps) that might become cancer. The growths can then be removed before they become cancer. It can also help find colon cancer early, when the cancer is easier to cure.  If you are 50 to 75 years old, your healthcare provider may recommend that you have a screening colonoscopy at least every 10 years. If you have a personal or family history that increases your risk of colon or rectal cancer, your provider may recommend that you start having the test at an earlier age and have the test more often. In some cases, the test may be recommended for people older than 75. People who are -American may have a screening colonoscopy at age 45.    Diagnosis of illness. If you have symptoms such as diarrhea, rectal bleeding, losing weight without trying to, or intestinal problems, you may have this test to try to find the cause of your symptoms.  How do I prepare for this procedure?    Find someone to give you a ride home after the procedure. You will not be allowed to drive yourself home.    Your healthcare provider will tell you when to stop eating and drinking before the procedure. This helps to keep you from vomiting during the procedure.    You may or may not need to take your regular medicines the day of the procedure. Tell your healthcare provider about all medicines and supplements that you take. Some products may increase your risk of side effects. Ask your healthcare provider if you need to avoid taking any medicine or supplements before the  procedure.    Tell your healthcare provider if you have any food, medicine, or other allergies such as latex.    Follow any other instructions your healthcare provider gives you.    You will be given instructions for clearing bowel movements from your intestines. Be sure to complete the bowel preparation as instructed, including what types of food and drink you can have in the days leading up to the procedure. The exam may not be done or may have to be repeated if your intestine still has bowel movement in it. Medicines used to prepare for this procedure will cause you to have several watery bowel movements until only clear movements occur. Stay close to the bathroom after you take the medicine. Talk to your pharmacist or healthcare provider about other symptoms you might have.    Ask any questions you have before the procedure. You should understand what your healthcare provider is going to do. You have the right to make decisions about your healthcare and to give permission for any tests or procedures.  What happens during this procedure?  This procedure may be done in the healthcare provider's office, outpatient clinic, or hospital.  Before the procedure you will be given medicine to help you relax, but you may be awake during the procedure.  You will lie on a table on your side with your knees bent and drawn up to your stomach. Your healthcare provider will pass the scope through your rectum and into your lower intestine and view the images of your intestines on a computer screen. Small amounts of air will be passed into your intestines so your provider can see as much of the area as possible.  If your provider sees anything abnormal during the exam, he or she may take small samples of tissue through the scope for lab tests. This is called a biopsy. Your provider may be able to remove polyps or small tumors through the scope.  What happens after this procedure?  After the procedure, you may stay in a recovery  area until you are awake and alert enough to be driven home. It is normal to have gas and mild cramps for a few hours after the exam. This will last until your body passes the extra air. If polyps or other tissue is removed, you may see a small amount of blood in your bowel movements for a short time.  Follow your healthcare provider's instructions. Ask your provider:    How long it will take to recover    If there are activities you should avoid and when you can return to your normal activities    How to take care of yourself at home    What symptoms or problems you should watch for and what to do if you have them  Make sure you know when you should come back for a checkup. Keep all appointments for provider visits or tests.  What are the risks of this procedure?  Every procedure or treatment has risks. Some possible risks of this procedure include:    You may have problems with anesthesia.    You may have infection or bleeding.    Other parts of your body may be injured during the procedure.  Ask your healthcare provider how the risks apply to you. Be sure to discuss any other questions or concerns that you may have.   Developed by [employer].  Adult Advisor 2016.3 published by [employer].  Last modified: 2016-03-30  Last reviewed: 2016-03-22  This content is reviewed periodically and is subject to change as new health information becomes available. The information is intended to inform and educate and is not a replacement for medical evaluation, advice, diagnosis or treatment by a healthcare professional.  References   Adult Advisor 2016.3 Index    Copyright   2016 [employer], a division of [employer] All rights reserved.          Index Related topics   Hemorrhoids   ________________________________________________________________________  KEY POINTS    Hemorrhoids are swollen veins in the lower end of your intestine (rectum) or the anus. They can cause pain, bleeding, and itching.    Your  healthcare provider will treat your hemorrhoids if they are causing discomfort or problems. Treatment may be a diet change, special baths, medicine, or surgery.    Always tell your healthcare provider when you have rectal bleeding. Although bleeding is often from hemorrhoids, more serious illnesses such as colon cancer, can also cause bleeding.  ________________________________________________________________________  What are hemorrhoids?  Hemorrhoids are swollen veins in the lower end of your intestine (rectum) or the anus. The anus is the opening where bowel movements pass out of your body.  Hemorrhoids are a common problem. They can cause pain, bleeding, and itching. Another name for them is piles.  Hemorrhoids may be external or internal.    External hemorrhoids can be seen or felt easily around the anal opening.    Internal hemorrhoids are inside the rectum. Sometimes they may stretch and fall out (prolapse) through the anus to outside the body.  What is the cause?  Hemorrhoids can result from too much pressure on veins in the rectum and around the anus. You may put pressure on these veins by:    Straining to have a bowel movement when you are constipated    Waiting too long to have a bowel movement    Sitting for a long time on the toilet, which causes strain on the anal area    Sitting anywhere for a long while    Coughing a lot, as happens with some lung diseases  Hemorrhoids may also develop from:    Diarrhea    Obesity    Injury to the anus, for example, from anal sex    Some liver diseases  You may have flare-ups of hemorrhoids when you are under stress. Some people inherit a tendency to have hemorrhoids.  Pregnant women should try to avoid getting constipated because hemorrhoids are more likely to happen during pregnancy. In the last trimester of pregnancy, the enlarged uterus may press on veins and cause hemorrhoids. Also, the strain of childbirth sometimes causes hemorrhoids after the birth.  What are  the symptoms?  Often hemorrhoids do not cause any symptoms. If you do have symptoms, they may include:    Itching, mild burning, and bleeding around the anus. For example, you might see bright red blood on toilet paper after wiping.    Swelling and tenderness around the anus    Pain with bowel movements    Painful lumps around the anus ranging in size from a pea to a walnut in severe cases  Internal hemorrhoids are often painless, but they sometimes cause a lot of bleeding. If the veins fall outside the anus, they may become irritated and painful.  How are they diagnosed?  Your healthcare provider will ask about your symptoms and medical history and examine you. Tests may include:    Rectal exam, which your provider does by gently putting a lubricated and gloved finger in your rectum    Anoscopy, which uses a small, lighted tube put into your rectum to look for hemorrhoids or other causes of bleeding  How are they treated?  They are a problem only if they are causing discomfort or problems. The following treatments usually help to relieve most cases of hemorrhoids:    High-fiber diet  Eat more high-fiber foods, which can help prevent constipation. The best sources of fiber are beans, such as navy, kidney, or black beans; whole-grain cereals, such as shredded wheat or cereals with bran; fresh fruit, such as apples with the skin; and raw or cooked vegetables, especially cabbage, carrots, corn, and broccoli.    Fluids  Drink plenty of water. This helps to soften bowel movements so they are easier to pass.    Sitz baths and cold packs    Sitting in a lukewarm bath 2 or 3 times a day for 15 minutes cleans the anal area and may relieve discomfort. (Don t let bath water get too hot. It could worsen swelling.)    Putting a cloth-covered ice pack on the anus or sitting on a covered ice pack for 10 minutes, 4 times a day might help.    Medicine  Ask your healthcare provider or pharmacist what nonprescription products might  help relieve pain and itching. If nonprescription medicines don t help, your provider may prescribe a cream or ointment for the painful area. Your provider may also prescribe medicated suppositories to put inside your rectum.    Procedures and surgeries  A number of procedures can be used to remove or shrink hemorrhoids that are not responding to other treatments, are large and very swollen, or are bothering you. These procedures include:    Hemorrhoid banding, which puts tight bands around the swollen veins. After a few weeks the tissue under the bands falls off, leaving a healed scar.    Destroying the hemorrhoids with freezing, electrical or laser heat, or infrared light    Shrinking the hemorrhoids with injection of a chemical around the swollen vein    For severe cases, surgery to cut out the hemorrhoids  How can I take care of myself?  Always tell your healthcare provider when you have rectal bleeding. Although bleeding is often from hemorrhoids, more serious illnesses, such as colon cancer, can also cause bleeding.  Follow these guidelines to help prevent hemorrhoids and to relieve their discomfort:    Don t strain during bowel movements. The straining makes hemorrhoids swell.    Eat a high-fiber diet and drink plenty of water.    If necessary, take a stool softener. Softer stools make it easier to empty the bowels and reduce pressure on the veins. Stool softeners are available without a prescription at most pharmacies and drug stores. If you have any questions about which product to buy, ask your healthcare provider or pharmacist for more information.    Don't overuse laxatives. Diarrhea can be as irritating to the anus as constipation.    Exercise regularly to help prevent constipation. Ask your healthcare provider for an exercise prescription.    Avoid a lot of wiping after a bowel movement if you have hemorrhoids. Wiping gently with soft, moist toilet paper or a commercial moist pad or baby wipe may  relieve discomfort. If necessary, shower instead of wiping and then dry the anus gently.    Avoid lifting heavy objects when you have hemorrhoids. It may increase the pressure on the veins and make the hemorrhoids worse.  Developed by [employer].  Adult Advisor 2016.3 published by [employer].  Last modified: 2015-10-02  Last reviewed: 2015-10-02  This content is reviewed periodically and is subject to change as new health information becomes available. The information is intended to inform and educate and is not a replacement for medical evaluation, advice, diagnosis or treatment by a healthcare professional.  References   Adult Advisor 2016.3 Index    Copyright   2016 [employer], a division of [employer] All rights reserved.             Electronically signed by Sarah Albright MD

## 2018-01-04 NOTE — NURSING NOTE
Patient Information     Patient Name MRN Lavonne Nava [id number] Female 1955      Nursing Note by Padmaja Perez at 2017  3:30 PM     Author:  Padmaja Perez Service:  (none) Author Type:  (none)     Filed:  2017  4:13 PM Encounter Date:  2017 Status:  Signed     :  Padmaja Perez            Patient presents to clinic with middle back pain that started today.  Padmaja Perez LPN....................  2017   3:56 PM

## 2018-01-04 NOTE — TELEPHONE ENCOUNTER
Patient Information     Patient Name MRN Lavonne Nava [id number] Female 1955      Telephone Encounter by Gayle Hewitt at 2017  1:49 PM     Author:  Gayle Hewitt Service:  (none) Author Type:  (none)     Filed:  2017  1:51 PM Encounter Date:  2017 Status:  Signed     :  Gayle Hewitt            Pharmacy notified of medication change.  Gayle Hewitt LPN.......................... 2017  1:51 PM

## 2018-01-04 NOTE — PATIENT INSTRUCTIONS
Patient Information     Patient Name MRN Sex Lavonne Sullivan [id number] Female 1955      Patient Instructions by Cady Palomo NP at 2017  3:30 PM     Author:  Cady Palomo NP  Service:  (none) Author Type:  PHYS- Nurse Practitioner     Filed:  4/10/2017  6:16 PM  Encounter Date:  2017 Status:  Addendum     :  Cady Palomo NP (PHYS- Nurse Practitioner)        Related Notes: Original Note by Cady Palomo NP (PHYS- Nurse Practitioner) filed at 2017  4:27 PM            Flexeril 10 mg three times a day as needed for muscle spasm-don't drive after taking  Ice to low back twice a day for 48 hours, then heat  Start Prednisone 40 mgs PO in morning with breakfast  Tylenol/Ibuprofen tonight for pain-no Ibuprofen while taking Prednisone

## 2018-01-04 NOTE — TELEPHONE ENCOUNTER
Patient Information     Patient Name MRN Lavonne Nava [id number] Female 1955      Telephone Encounter by Lorraine Fowler at 2017  9:35 AM     Author:  Lorraine Fowler Service:  (none) Author Type:  (none)     Filed:  2017  9:37 AM Encounter Date:  2017 Status:  Signed     :  Lorraine Fowler            Called patient and left messages  on , ,  and 2017 to schedule colonoscopy .  Letter has been sent today for her to call to schedule the colonoscopy.

## 2018-01-04 NOTE — PATIENT INSTRUCTIONS
Patient Information     Patient Name MRN Sex Lavonne Sullivan [id number] Female 1955      Patient Instructions by Sarah Albright MD at 2017  4:25 PM     Author:  Sarah Albright MD  Service:  (none) Author Type:  Physician     Filed:  2017  4:28 PM  Encounter Date:  2017 Status:  Addendum     :  Sarah Albright MD (Physician)        Related Notes: Original Note by Sarah Albright MD (Physician) filed at 2017  4:25 PM            Hydrocortisone suppositories twice a day 3-5 days  increase fiber intake to 40 g a day  May use Nupercainal over-the-counter when necessary discomfort  referral to Dr. Lovett for colonoscopy    Weight Loss Strategies  1. Eat at least 3 meals a day and never skip breakfast.  2. Eat more slowly.  3. Decrease portion size.  4. Provide structure by using meal replacement bars or shakes, and/or low calorie frozen meals.  5. For good nutrition incorporate fruit, vegetables, whole grains, lean protein, and low-fat dairy.  6. Remove trigger foods from your environment to avoid impulse eating.  7. Increase physical activity: get a pedometer and aim for 10,000 steps a day or 30-35 minutes of activity 5 days per week.  8. Weigh yourself daily or at least weekly.  9. Keep a record of what you eat and your activity.  10. Establish a support system such as a friend, group or program.         Index Ecuadorean All languages Related topics   Colonoscopy   ________________________________________________________________________  KEY POINTS    A colonoscopy is an exam of your large intestine, also called the colon, with a thin, flexible, lighted tube and tiny camera. This scope is put through your rectum and into your large intestine.    A colonoscopy is used to check for growths or cancer, or to find the cause of symptoms such as diarrhea, rectal bleeding, or other problems in your intestines.    You will be given instructions for  clearing bowel movements from your intestines. Be sure to complete the bowel preparation as instructed, including what types of food and drink you can have in the days leading up to the procedure.  ________________________________________________________________________  What is a colonoscopy?  A colonoscopy is an exam of your large intestine, also called the colon, with a thin, flexible, lighted tube and tiny camera. This scope is put through your rectum and into your large intestine.  When is it used?  Colonoscopy is the most direct and complete way to check the entire lining of the colon. It is usually done for one of the following reasons:    Prevention and early detection of cancer. A colonoscopy can help your healthcare provider find growths (polyps) that might become cancer. The growths can then be removed before they become cancer. It can also help find colon cancer early, when the cancer is easier to cure.  If you are 50 to 75 years old, your healthcare provider may recommend that you have a screening colonoscopy at least every 10 years. If you have a personal or family history that increases your risk of colon or rectal cancer, your provider may recommend that you start having the test at an earlier age and have the test more often. In some cases, the test may be recommended for people older than 75. People who are -American may have a screening colonoscopy at age 45.    Diagnosis of illness. If you have symptoms such as diarrhea, rectal bleeding, losing weight without trying to, or intestinal problems, you may have this test to try to find the cause of your symptoms.  How do I prepare for this procedure?    Find someone to give you a ride home after the procedure. You will not be allowed to drive yourself home.    Your healthcare provider will tell you when to stop eating and drinking before the procedure. This helps to keep you from vomiting during the procedure.    You may or may not need to take  your regular medicines the day of the procedure. Tell your healthcare provider about all medicines and supplements that you take. Some products may increase your risk of side effects. Ask your healthcare provider if you need to avoid taking any medicine or supplements before the procedure.    Tell your healthcare provider if you have any food, medicine, or other allergies such as latex.    Follow any other instructions your healthcare provider gives you.    You will be given instructions for clearing bowel movements from your intestines. Be sure to complete the bowel preparation as instructed, including what types of food and drink you can have in the days leading up to the procedure. The exam may not be done or may have to be repeated if your intestine still has bowel movement in it. Medicines used to prepare for this procedure will cause you to have several watery bowel movements until only clear movements occur. Stay close to the bathroom after you take the medicine. Talk to your pharmacist or healthcare provider about other symptoms you might have.    Ask any questions you have before the procedure. You should understand what your healthcare provider is going to do. You have the right to make decisions about your healthcare and to give permission for any tests or procedures.  What happens during this procedure?  This procedure may be done in the healthcare provider's office, outpatient clinic, or hospital.  Before the procedure you will be given medicine to help you relax, but you may be awake during the procedure.  You will lie on a table on your side with your knees bent and drawn up to your stomach. Your healthcare provider will pass the scope through your rectum and into your lower intestine and view the images of your intestines on a computer screen. Small amounts of air will be passed into your intestines so your provider can see as much of the area as possible.  If your provider sees anything abnormal  during the exam, he or she may take small samples of tissue through the scope for lab tests. This is called a biopsy. Your provider may be able to remove polyps or small tumors through the scope.  What happens after this procedure?  After the procedure, you may stay in a recovery area until you are awake and alert enough to be driven home. It is normal to have gas and mild cramps for a few hours after the exam. This will last until your body passes the extra air. If polyps or other tissue is removed, you may see a small amount of blood in your bowel movements for a short time.  Follow your healthcare provider's instructions. Ask your provider:    How long it will take to recover    If there are activities you should avoid and when you can return to your normal activities    How to take care of yourself at home    What symptoms or problems you should watch for and what to do if you have them  Make sure you know when you should come back for a checkup. Keep all appointments for provider visits or tests.  What are the risks of this procedure?  Every procedure or treatment has risks. Some possible risks of this procedure include:    You may have problems with anesthesia.    You may have infection or bleeding.    Other parts of your body may be injured during the procedure.  Ask your healthcare provider how the risks apply to you. Be sure to discuss any other questions or concerns that you may have.   Developed by [employer].  Adult Advisor 2016.3 published by [employer].  Last modified: 2016-03-30  Last reviewed: 2016-03-22  This content is reviewed periodically and is subject to change as new health information becomes available. The information is intended to inform and educate and is not a replacement for medical evaluation, advice, diagnosis or treatment by a healthcare professional.  References   Adult Advisor 2016.3 Index    Copyright   2016 [employer], a division of [employer] All rights  reserved.          Index Related topics   Hemorrhoids   ________________________________________________________________________  KEY POINTS    Hemorrhoids are swollen veins in the lower end of your intestine (rectum) or the anus. They can cause pain, bleeding, and itching.    Your healthcare provider will treat your hemorrhoids if they are causing discomfort or problems. Treatment may be a diet change, special baths, medicine, or surgery.    Always tell your healthcare provider when you have rectal bleeding. Although bleeding is often from hemorrhoids, more serious illnesses such as colon cancer, can also cause bleeding.  ________________________________________________________________________  What are hemorrhoids?  Hemorrhoids are swollen veins in the lower end of your intestine (rectum) or the anus. The anus is the opening where bowel movements pass out of your body.  Hemorrhoids are a common problem. They can cause pain, bleeding, and itching. Another name for them is piles.  Hemorrhoids may be external or internal.    External hemorrhoids can be seen or felt easily around the anal opening.    Internal hemorrhoids are inside the rectum. Sometimes they may stretch and fall out (prolapse) through the anus to outside the body.  What is the cause?  Hemorrhoids can result from too much pressure on veins in the rectum and around the anus. You may put pressure on these veins by:    Straining to have a bowel movement when you are constipated    Waiting too long to have a bowel movement    Sitting for a long time on the toilet, which causes strain on the anal area    Sitting anywhere for a long while    Coughing a lot, as happens with some lung diseases  Hemorrhoids may also develop from:    Diarrhea    Obesity    Injury to the anus, for example, from anal sex    Some liver diseases  You may have flare-ups of hemorrhoids when you are under stress. Some people inherit a tendency to have hemorrhoids.  Pregnant women  should try to avoid getting constipated because hemorrhoids are more likely to happen during pregnancy. In the last trimester of pregnancy, the enlarged uterus may press on veins and cause hemorrhoids. Also, the strain of childbirth sometimes causes hemorrhoids after the birth.  What are the symptoms?  Often hemorrhoids do not cause any symptoms. If you do have symptoms, they may include:    Itching, mild burning, and bleeding around the anus. For example, you might see bright red blood on toilet paper after wiping.    Swelling and tenderness around the anus    Pain with bowel movements    Painful lumps around the anus ranging in size from a pea to a walnut in severe cases  Internal hemorrhoids are often painless, but they sometimes cause a lot of bleeding. If the veins fall outside the anus, they may become irritated and painful.  How are they diagnosed?  Your healthcare provider will ask about your symptoms and medical history and examine you. Tests may include:    Rectal exam, which your provider does by gently putting a lubricated and gloved finger in your rectum    Anoscopy, which uses a small, lighted tube put into your rectum to look for hemorrhoids or other causes of bleeding  How are they treated?  They are a problem only if they are causing discomfort or problems. The following treatments usually help to relieve most cases of hemorrhoids:    High-fiber diet  Eat more high-fiber foods, which can help prevent constipation. The best sources of fiber are beans, such as navy, kidney, or black beans; whole-grain cereals, such as shredded wheat or cereals with bran; fresh fruit, such as apples with the skin; and raw or cooked vegetables, especially cabbage, carrots, corn, and broccoli.    Fluids  Drink plenty of water. This helps to soften bowel movements so they are easier to pass.    Sitz baths and cold packs    Sitting in a lukewarm bath 2 or 3 times a day for 15 minutes cleans the anal area and may relieve  discomfort. (Don t let bath water get too hot. It could worsen swelling.)    Putting a cloth-covered ice pack on the anus or sitting on a covered ice pack for 10 minutes, 4 times a day might help.    Medicine  Ask your healthcare provider or pharmacist what nonprescription products might help relieve pain and itching. If nonprescription medicines don t help, your provider may prescribe a cream or ointment for the painful area. Your provider may also prescribe medicated suppositories to put inside your rectum.    Procedures and surgeries  A number of procedures can be used to remove or shrink hemorrhoids that are not responding to other treatments, are large and very swollen, or are bothering you. These procedures include:    Hemorrhoid banding, which puts tight bands around the swollen veins. After a few weeks the tissue under the bands falls off, leaving a healed scar.    Destroying the hemorrhoids with freezing, electrical or laser heat, or infrared light    Shrinking the hemorrhoids with injection of a chemical around the swollen vein    For severe cases, surgery to cut out the hemorrhoids  How can I take care of myself?  Always tell your healthcare provider when you have rectal bleeding. Although bleeding is often from hemorrhoids, more serious illnesses, such as colon cancer, can also cause bleeding.  Follow these guidelines to help prevent hemorrhoids and to relieve their discomfort:    Don t strain during bowel movements. The straining makes hemorrhoids swell.    Eat a high-fiber diet and drink plenty of water.    If necessary, take a stool softener. Softer stools make it easier to empty the bowels and reduce pressure on the veins. Stool softeners are available without a prescription at most pharmacies and drug stores. If you have any questions about which product to buy, ask your healthcare provider or pharmacist for more information.    Don't overuse laxatives. Diarrhea can be as irritating to the anus as  constipation.    Exercise regularly to help prevent constipation. Ask your healthcare provider for an exercise prescription.    Avoid a lot of wiping after a bowel movement if you have hemorrhoids. Wiping gently with soft, moist toilet paper or a commercial moist pad or baby wipe may relieve discomfort. If necessary, shower instead of wiping and then dry the anus gently.    Avoid lifting heavy objects when you have hemorrhoids. It may increase the pressure on the veins and make the hemorrhoids worse.  Developed by [employer].  Adult Advisor 2016.3 published by [employer].  Last modified: 2015-10-02  Last reviewed: 2015-10-02  This content is reviewed periodically and is subject to change as new health information becomes available. The information is intended to inform and educate and is not a replacement for medical evaluation, advice, diagnosis or treatment by a healthcare professional.  References   Adult Advisor 2016.3 Index    Copyright   2016 [employer], a division of [employer] All rights reserved.

## 2018-01-04 NOTE — NURSING NOTE
Patient Information     Patient Name MRN Sex Lavonne Sullivan [id number] Female 1955      Nursing Note by Liliana Rodriguez at 2017  3:45 PM     Author:  Liliana Rodriguez Service:  (none) Author Type:  (none)     Filed:  2017  4:01 PM Encounter Date:  2017 Status:  Signed     :  Liliana Rodriguez            Patient states she has a lump near rectum which has been there for a few weeks, hemorrhoid cream isn't helping, denies bleeding or pain. She has a question regarding her pinky finger on L hand. Would like a Tetanus shot and would like to talk with  Regarding the Shingles vaccine. Liliana Rodriguez LPN......................2017 3:55 PM

## 2018-01-04 NOTE — TELEPHONE ENCOUNTER
Patient Information     Patient Name MRN Lavonne Nava [id number] Female 1955      Telephone Encounter by Gayle Hewitt at 2017 11:23 AM     Author:  Gayle Hewitt Service:  (none) Author Type:  (none)     Filed:  2017 11:24 AM Encounter Date:  2017 Status:  Signed     :  Gayle Hewitt            Patient's Anusol suppository's is not covered by her insurance. Would you like a PA done or change to a different medication?    Gayle Hewitt LPN.......................... 2017  11:24 AM

## 2018-01-04 NOTE — PROGRESS NOTES
Patient Information     Patient Name MRN Sex Lavonne Sullivan [id number] Female 1955      Progress Notes by Cady Palomo NP at 2017  3:30 PM     Author:  Cady Palomo NP Service:  (none) Author Type:  PHYS- Nurse Practitioner     Filed:  4/10/2017  6:32 PM Encounter Date:  2017 Status:  Signed     :  Cady Palomo NP (PHYS- Nurse Practitioner)            HPI:  Nursing Notes:   Padmaja Perez  2017  4:13 PM  Signed  Patient presents to clinic with middle back pain that started today.  [place] M Chris LPN....................  2017   3:56 PM      Lavonne Barrios is a 61 y.o. female who presents to clinic today for pain in lower back that started on right side and now radiates across low back to left side. Moving a certain way worsens pain, causing sharp pain. Denies urinary pain, blood in urine,numbness or tingling down legs, able to ambulate without difficulty-moves more slowly due to to discomfort, no loss of control to bladder or bowels. Has had low back pain in the past, no history of back injury. Spends lots of time on feet at work and home. Treating symptoms with Ibuprofen which help briefly. Sitting worsens pain.     Past Medical History:     Diagnosis  Date     Impingement syndrome of right shoulder      Morbid obesity (HC)     BMI 42.5, based off height 67 inches, abdominal girth greater than 35. patient was counseled on obesity.       Skin rash     Recurrent      Thrombosed hemorrhoids      Past Surgical History:      Procedure  Laterality Date     COLONOSCOPY      Normal, Followup recommended in ten years.       HERNIA REPAIR  1988     HYSTEROSCOPY W/ ENDOMETRIAL ABLATION  10/08/2015     HYSTEROSCOPY W/ POLYPECTOMY  10/08/2015    frozen section pathology of endomertrium       TUBAL LIGATION       Social History       Substance Use Topics         Smoking status:   Never Smoker     Smokeless tobacco:   Never Used     Alcohol  use   No      Comment: [facility]      Current Outpatient Prescriptions       Medication  Sig Dispense Refill     atorvastatin (LIPITOR) 80 mg tablet TAKE ONE TABLET BY MOUTH ONCE DAILY 90 tablet 1     multivitamin (MVI) tablet Take 1 tablet by mouth once daily.       oxybutynin XL (DITROPAN XL) 5 mg CR tablet Take 1 tablet by mouth once daily. 90 tablet 2     No current facility-administered medications for this visit.      Medications have been reviewed by me and are current to the best of my knowledge and ability.    No Known Allergies    ROS:  Refer to HPI    /78  Pulse 74  Temp 97.4  F (36.3  C) (Tympanic)   Wt 128.5 kg (283 lb 4 oz)  BMI 45.72 kg/m2    EXAM:  General Appearance: Well appearing, pleasant female appropriate appearance for age. No acute distress  Musculoskeletal:  tenderness across low back, paraspinous muscles and SI joints bilaterally, decreased active ROM  Neurological: brisk patellar reflexes bilaterally, walks on toes/heels without diffciculty  Dermatological: No bruising, swelling, erythema low back  Psychological: normal affect, alert and pleasant    ASSESSMENT/PLAN:    ICD-10-CM    1. Spondylolysis with spondylolisthesis M43.00      M43.10    2. SI (sacroiliac) joint inflammation (HC) M46.1    3. Acute bilateral low back pain without sciatica M54.5 XR SPINE LUMBAR 3 VIEWS      predniSONE (DELTASONE) 20 mg tablet      cyclobenzaprine (FLEXERIL) 10 mg tablet   Bilateral low back pain  On exam:  tenderness across low back, paraspinous muscles and SI joints bilaterally, decreased active ROM, brisk patellar reflexes bilaterally, walks on toes/heels without diffciculty  Xray obtained and reviewed-anterolisthesis of L5 on S1 with bilateral SI joint degeneration  Diagnosis: SI joint Degenerations and Anterolisthesis  Treat with Prednisone 40 mgs PO five days  Flexeril 10 mgs PO TID prn muscle spasm-do not drive after taking  Please follow up with PCP if symptoms continue or  worsen    Patient Instructions   Flexeril 10 mg three times a day as needed for muscle spasm-don't drive after taking  Ice to low back twice a day for 48 hours, then heat  Start Prednisone 40 mgs PO in morning with breakfast  Tylenol/Ibuprofen tonight for pain-no Ibuprofen while taking Prednisone

## 2018-01-25 VITALS
TEMPERATURE: 98.6 F | RESPIRATION RATE: 20 BRPM | WEIGHT: 281.13 LBS | BODY MASS INDEX: 45.18 KG/M2 | HEART RATE: 64 BPM | DIASTOLIC BLOOD PRESSURE: 72 MMHG | HEIGHT: 66 IN | SYSTOLIC BLOOD PRESSURE: 134 MMHG

## 2018-01-25 VITALS
HEART RATE: 74 BPM | WEIGHT: 283.25 LBS | BODY MASS INDEX: 45.72 KG/M2 | DIASTOLIC BLOOD PRESSURE: 78 MMHG | TEMPERATURE: 97.4 F | SYSTOLIC BLOOD PRESSURE: 130 MMHG

## 2018-01-25 VITALS
HEIGHT: 66 IN | SYSTOLIC BLOOD PRESSURE: 130 MMHG | DIASTOLIC BLOOD PRESSURE: 80 MMHG | BODY MASS INDEX: 45.64 KG/M2 | WEIGHT: 284 LBS | HEART RATE: 56 BPM

## 2018-01-25 VITALS
HEART RATE: 60 BPM | BODY MASS INDEX: 45.32 KG/M2 | SYSTOLIC BLOOD PRESSURE: 140 MMHG | DIASTOLIC BLOOD PRESSURE: 82 MMHG | WEIGHT: 282 LBS | HEIGHT: 66 IN

## 2018-02-04 ASSESSMENT — ANXIETY QUESTIONNAIRES: GAD7 TOTAL SCORE: 1

## 2018-02-13 ENCOUNTER — DOCUMENTATION ONLY (OUTPATIENT)
Dept: FAMILY MEDICINE | Facility: OTHER | Age: 63
End: 2018-02-13

## 2018-02-13 PROBLEM — M43.10 SPONDYLOLYSIS WITH SPONDYLOLISTHESIS: Status: ACTIVE | Noted: 2017-04-10

## 2018-02-13 PROBLEM — Z80.8 FAMILY HISTORY OF MELANOMA: Status: ACTIVE | Noted: 2018-02-13

## 2018-02-13 RX ORDER — OXYBUTYNIN CHLORIDE 5 MG/1
1 TABLET, EXTENDED RELEASE ORAL DAILY
COMMUNITY
Start: 2017-07-26 | End: 2018-08-06

## 2018-02-13 RX ORDER — ATORVASTATIN CALCIUM 80 MG/1
1 TABLET, FILM COATED ORAL DAILY
COMMUNITY
Start: 2017-07-26 | End: 2018-08-09

## 2018-07-23 NOTE — PROGRESS NOTES
Patient Information     Patient Name  Lavonne Barrios MRN  [id number] Sex  Female   1955      Letter by Sarah Albright MD at      Author:  Sarah Albright MD Service:  (none) Author Type:  (none)    Filed:   Encounter Date:  2017 Status:  (Other)           Lavonne Barrios  [address] [place] [id number]          2017    Dear Ms. Barrios:    This letter is in regards to scheduling your screening colonoscopy that Sarah Albright MD  has recommended.  We have been unable to reach you by phone.  If you would like me to schedule this for you, I would be happy to do so at your convenience.  Our surgical scheduler can be reached at [phone]. She is available Monday thru Thursday 8:30am until 4:30pm.  As I am sure you are aware, often times we can detect precancerous colon polyps during screening colonoscopies and prevent colon cancer.  I encourage you to follow through with this very important screening test.    Sincerely,      Colonoscopy/EGD Scheduling Department      Reviewed and electronically signed by provider.

## 2018-07-23 NOTE — PROGRESS NOTES
Patient Information     Patient Name  Lavonne Barrios MRN  [id number] Sex  Female   1955      Letter by Sarah Albright MD at      Author:  Sarah Albright MD Service:  (none) Author Type:  (none)    Filed:   Encounter Date:  2017 Status:  (Other)           Lavonne Barrios  [address] [place] [id number]          2017    Dear Ms. Barrios:    This letter is to remind you that you are due for your annual exam with Sarah Albright MD. Your last comprehensive visit was 16.    A LIMITED refill of         oxybutynin XL (DITROPAN XL) 5 mg CR tablet     has been called into your pharmacy. Additional refills require you to complete this appointment.    Please call the clinic at [phone] to schedule your appointment.    If you should require additional refills before your scheduled appointment, please contact your pharmacy and we will refill your medication until the date of your appointment.    If you are no longer seeing Sarah Albright MD for primary care, please call to let us know. Doing so will remove you from our call/contact list.      Thank you for choosing [facility] and [facility] for your health care needs.    Sincerely,    Refill RN  [facility]

## 2018-07-23 NOTE — PROGRESS NOTES
Patient Information     Patient Name  Lavonne Barrios MRN  [id number] Sex  Female   1955      Letter by Mavis Tejada at      Author:  Mavis Tejada Service:  (none) Author Type:  (none)    Filed:   Encounter Date:  2017 Status:  (Other)           Lavonne Barrios  [address] [place] [id number]          May 24, 2017    Dear Ms. Barrios:    It has come to our attention that you are due for a colonoscopy.  According to our records, your last colonoscopy was done on 6/15/07.  It  is time for your repeat colonoscopy.  Please contact the Schedulers at [phone] and we will be happy to set up this colonoscopy for you.  If you have had a colonoscopy since your last one with us, please let us know so we can update our records.      Sincerely,     General Surgery      Reviewed and electronically signed by provider.

## 2018-07-24 NOTE — PROGRESS NOTES
Patient Information     Patient Name  Lavonne Barrios MRN  [id number] Sex  Female   1955      Letter by Sarah Albright MD at      Author:  Sarah Albright MD Service:  (none) Author Type:  (none)    Filed:   Encounter Date:  2017 Status:  (Other)           Lavonne Barrios  [address] [facility] [address]          2017    Dear Ms. Barrios:    Following are the tests completed during your last clinic visit.  The results of these tests are normal and require no further attention unless otherwise noted.    Results for orders placed or performed in visit on 17      COMP METABOLIC PANEL      Result  Value Ref Range    SODIUM 142 133 - 143 mmol/L    POTASSIUM 3.9 3.5 - 5.1 mmol/L    CHLORIDE 107 98 - 107 mmol/L    CO2,TOTAL 27 21 - 31 mmol/L    ANION GAP 8 5 - 18                    GLUCOSE 104 70 - 105 mg/dL    CALCIUM 9.2 8.6 - 10.3 mg/dL    BUN 13 7 - 25 mg/dL    CREATININE 0.77 0.70 - 1.30 mg/dL    BUN/CREAT RATIO           17                    GFR if African American >60 >60 ml/min/1.73m2    GFR if not African American >60 >60 ml/min/1.73m2    ALBUMIN 4.2 3.5 - 5.7 g/dL    PROTEIN,TOTAL 7.1 6.4 - 8.9 g/dL    GLOBULIN                  2.9 2.0 - 3.7 g/dL    A/G RATIO 1.4 1.0 - 2.0                    BILIRUBIN,TOTAL 1.2 (H) 0.3 - 1.0 mg/dL    ALK PHOSPHATASE 126 (H) 34 - 104 IU/L    ALT (SGPT) 20 7 - 52 IU/L    AST (SGOT) 22 13 - 39 IU/L   TSH      Result  Value Ref Range    TSH 3.09 0.34 - 5.60 uIU/mL   CBC WITH AUTO DIFFERENTIAL      Result  Value Ref Range    WHITE BLOOD COUNT         7.0 4.5 - 11.0 thou/cu mm    RED BLOOD COUNT           4.92 4.00 - 5.20 mil/cu mm    HEMOGLOBIN                14.0 12.0 - 16.0 g/dL    HEMATOCRIT                42.6 33.0 - 51.0 %    MCV                       87 80 - 100 fL    MCH                       28.5 26.0 - 34.0 pg    MCHC                      32.9 32.0 - 36.0 g/dL    RDW                       13.8 11.5 - 15.5 %    PLATELET COUNT             243 140 - 440 thou/cu mm    MPV                       10.9 6.5 - 11.0 fL    NEUTROPHILS               67.6 42.0 - 72.0 %    LYMPHOCYTES               22.9 20.0 - 44.0 %    MONOCYTES                 7.7 <12.0 %    EOSINOPHILS               1.1 <8.0 %    BASOPHILS                 0.6 <3.0 %    IMMATURE GRANULOCYTES(METAS,MYELOS,PROS) 0.1 %    ABSOLUTE NEUTROPHILS      4.7 1.7 - 7.0 thou/cu mm    ABSOLUTE LYMPHOCYTES      1.6 0.9 - 2.9 thou/cu mm    ABSOLUTE MONOCYTES        0.5 <0.9 thou/cu mm    ABSOLUTE EOSINOPHILS      0.1 <0.5 thou/cu mm    ABSOLUTE BASOPHILS        0.0 <0.3 thou/cu mm    ABSOLUTE IMMATURE GRANULOCYTES(METAS,MYELOS,PROS) 0.0 <=0.3 thou/cu mm   URINALYSIS W REFLEX MICROSCOPIC IF POSITIVE      Result  Value Ref Range    COLOR                     Yellow Yellow Color    CLARITY                   Clear Clear Clarity    SPECIFIC GRAVITY,URINE    1.015 1.010, 1.015, 1.020, 1.025                    PH,URINE                  7.5 6.0, 7.0, 8.0, 5.5, 6.5, 7.5, 8.5                    UROBILINOGEN,QUALITATIVE  Normal Normal EU/dl    PROTEIN, URINE Negative Negative mg/dL    GLUCOSE, URINE Negative Negative mg/dL    KETONES,URINE             Negative Negative mg/dL    BILIRUBIN,URINE           Negative Negative                    OCCULT BLOOD,URINE        Negative Negative                    NITRITE                   Negative Negative                    LEUKOCYTE ESTERASE        Negative Negative                   LIPID PANEL      Result  Value Ref Range    CHOLESTEROL,TOTAL 157 <200 mg/dL    TRIGLYCERIDES 74 <150 mg/dL    HDL CHOLESTEROL 56 23 - 92 mg/dL    NON-HDL CHOLESTEROL 101 <145 mg/dl    CHOL/HDL RATIO            2.80 <4.50                    LDL CHOLESTEROL 86 <100 mg/dL    PATIENT STATUS            FASTING                        If you have any further questions or problems contact my office at the above number.  I trust this finds you in good health and spirits.      Sincerely,              Electronically signed by Sarah Albright MD

## 2018-07-24 NOTE — PROGRESS NOTES
Patient Information     Patient Name  Lavonne Barrios MRN  [id number] Sex  Female   1955      Letter by Nahed Snow DO at      Author:  Nahed Snow DO Service:  (none) Author Type:  (none)    Filed:   Encounter Date:  7/3/2017 Status:  (Other)           Lavonne Barrios  [address] [place] [id number]          July 3, 2017      CERTIFICATE TO RETURN TO WORK       Lavonne Barrios was seen on 7/3/2017 and due to acute injury is unable to return to work for 5-7 days.  She should be able to return on 7/10/2017 with no restrictions.      Please call if you have any questions or concerns and I will assist as able.    Sincerely,      NAHED SNOW DO

## 2018-08-06 DIAGNOSIS — N39.41 URINARY INCONTINENCE, URGE: Primary | ICD-10-CM

## 2018-08-08 RX ORDER — OXYBUTYNIN CHLORIDE 5 MG/1
TABLET, EXTENDED RELEASE ORAL
Qty: 30 TABLET | Refills: 0 | Status: SHIPPED | OUTPATIENT
Start: 2018-08-08

## 2018-08-08 NOTE — TELEPHONE ENCOUNTER
"Refill request from [facility] for:  oxybutynin (DITROPAN-XL) 5 MG 24 hr tablet    LOV with Sarah Albright MD    In the absence of Sarah Albright, patient was called to recommend that they establish care with one of our other providers. Patient intends to establish care with another one of our providers at a later time as patient was on a limited lunch break.     Patient is requesting a limited refill at this time. Please review and sign if appropriate.    This is a Refill request from: [facility]  Name of Medication: oxybutynin (DITROPAN-XL) 5 MG 24 hr tablet  Quantity requested: 90 day supplies (prev. 30 with 8 refills  Last fill date: 7/26/2017 for 90 X 2  Due for refill: yes  PCP:  Sarah Albright  Controlled Substance Agreement:  N/A   Diagnosis r/t this medication request: urge incontinence     Contacted patient and they are requesting a limited refill to get them by until they establish care with another provider. Patient was on a limited lunch break and will call back at a more convenient time to schedule an establish care appt.     Will provide limited 30 day refill at this time.     Requested Prescriptions   Pending Prescriptions Disp Refills     oxybutynin (DITROPAN-XL) 5 MG 24 hr tablet [Pharmacy Med Name: OXYBUTYNIN ER 5MG   TAB] 30 tablet 8     Sig: TAKE ONE TABLET BY MOUTH ONCE DAILY    Muscarinic Antagonists (Urinary Incontinence Agents) Failed    8/6/2018  5:30 AM       Failed - Recent (12 mo) or future (30 days) visit within the authorizing provider's specialty    Patient had office visit in the last 12 months or has a visit in the next 30 days with authorizing provider or within the authorizing provider's specialty.  See \"Patient Info\" tab in inbasket, or \"Choose Columns\" in Meds & Orders section of the refill encounter.           Passed - Medication is Oxybutynin and patient is 5 years of age or older       Passed - Patient does not have a diagnosis of glaucoma on the problem list    If " glaucoma diagnosis is new, refer refill to physician.         Passed - Patient is 18 years of age or older        Mi Williamson RN  ....................  8/8/2018   11:53 AM

## 2018-08-09 ENCOUNTER — APPOINTMENT (OUTPATIENT)
Dept: GENERAL RADIOLOGY | Facility: OTHER | Age: 63
End: 2018-08-09
Attending: PHYSICIAN ASSISTANT
Payer: OTHER MISCELLANEOUS

## 2018-08-09 ENCOUNTER — ANESTHESIA (OUTPATIENT)
Dept: EMERGENCY MEDICINE | Facility: OTHER | Age: 63
End: 2018-08-09
Payer: OTHER MISCELLANEOUS

## 2018-08-09 ENCOUNTER — ANESTHESIA EVENT (OUTPATIENT)
Dept: EMERGENCY MEDICINE | Facility: OTHER | Age: 63
End: 2018-08-09
Payer: OTHER MISCELLANEOUS

## 2018-08-09 ENCOUNTER — HOSPITAL ENCOUNTER (EMERGENCY)
Facility: OTHER | Age: 63
Discharge: HOME | End: 2018-08-09
Attending: PHYSICIAN ASSISTANT | Admitting: PHYSICIAN ASSISTANT
Payer: OTHER MISCELLANEOUS

## 2018-08-09 VITALS
SYSTOLIC BLOOD PRESSURE: 141 MMHG | DIASTOLIC BLOOD PRESSURE: 73 MMHG | HEART RATE: 71 BPM | OXYGEN SATURATION: 94 % | RESPIRATION RATE: 20 BRPM | TEMPERATURE: 98.2 F | HEIGHT: 67 IN

## 2018-08-09 DIAGNOSIS — S46.001A ROTATOR CUFF INJURY, RIGHT, INITIAL ENCOUNTER: ICD-10-CM

## 2018-08-09 DIAGNOSIS — S42.291A HILL SACHS DEFORMITY, RIGHT: ICD-10-CM

## 2018-08-09 DIAGNOSIS — S43.004A SHOULDER DISLOCATION, RIGHT, INITIAL ENCOUNTER: ICD-10-CM
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
## 2018-08-09 PROCEDURE — 25000125 ZZHC RX 250: Performed by: NURSE ANESTHETIST, CERTIFIED REGISTERED

## 2018-08-09 PROCEDURE — 99283 EMERGENCY DEPT VISIT LOW MDM: CPT | Mod: 25 | Performed by: PHYSICIAN ASSISTANT

## 2018-08-09 PROCEDURE — 23655 CLTX SHO DSLC W/MNPJ W/ANES: CPT | Performed by: PHYSICIAN ASSISTANT

## 2018-08-09 PROCEDURE — 99140 ANES COMP EMERGENCY COND: CPT | Performed by: NURSE ANESTHETIST, CERTIFIED REGISTERED

## 2018-08-09 PROCEDURE — 25000128 H RX IP 250 OP 636: Performed by: PHYSICIAN ASSISTANT

## 2018-08-09 PROCEDURE — 73030 X-RAY EXAM OF SHOULDER: CPT | Mod: RT

## 2018-08-09 PROCEDURE — 23655 CLTX SHO DSLC W/MNPJ W/ANES: CPT | Mod: Z6 | Performed by: PHYSICIAN ASSISTANT

## 2018-08-09 PROCEDURE — 40000986 XR SHOULDER 2 VIEW RIGHT

## 2018-08-09 PROCEDURE — 25000128 H RX IP 250 OP 636: Performed by: NURSE ANESTHETIST, CERTIFIED REGISTERED

## 2018-08-09 PROCEDURE — 99283 EMERGENCY DEPT VISIT LOW MDM: CPT | Mod: Z6 | Performed by: PHYSICIAN ASSISTANT

## 2018-08-09 PROCEDURE — 23650 CLTX SHO DSLC W/MNPJ WO ANES: CPT | Performed by: PHYSICIAN ASSISTANT

## 2018-08-09 PROCEDURE — 23655 CLTX SHO DSLC W/MNPJ W/ANES: CPT | Performed by: NURSE ANESTHETIST, CERTIFIED REGISTERED

## 2018-08-09 RX ORDER — FENTANYL CITRATE 50 UG/ML
50 INJECTION, SOLUTION INTRAMUSCULAR; INTRAVENOUS
Status: DISCONTINUED | OUTPATIENT
Start: 2018-08-09 | End: 2018-08-09 | Stop reason: HOSPADM

## 2018-08-09 RX ORDER — ONDANSETRON 4 MG/1
4 TABLET, ORALLY DISINTEGRATING ORAL EVERY 30 MIN PRN
Status: CANCELLED | OUTPATIENT
Start: 2018-08-09

## 2018-08-09 RX ORDER — LIDOCAINE HYDROCHLORIDE 20 MG/ML
INJECTION, SOLUTION INFILTRATION; PERINEURAL PRN
Status: DISCONTINUED | OUTPATIENT
Start: 2018-08-09 | End: 2018-08-09

## 2018-08-09 RX ORDER — ONDANSETRON 2 MG/ML
4 INJECTION INTRAMUSCULAR; INTRAVENOUS EVERY 30 MIN PRN
Status: CANCELLED | OUTPATIENT
Start: 2018-08-09

## 2018-08-09 RX ORDER — SODIUM CHLORIDE, SODIUM LACTATE, POTASSIUM CHLORIDE, CALCIUM CHLORIDE 600; 310; 30; 20 MG/100ML; MG/100ML; MG/100ML; MG/100ML
INJECTION, SOLUTION INTRAVENOUS CONTINUOUS PRN
Status: DISCONTINUED | OUTPATIENT
Start: 2018-08-09 | End: 2018-08-09

## 2018-08-09 RX ORDER — IBUPROFEN 800 MG/1
800 TABLET, FILM COATED ORAL EVERY 8 HOURS PRN
Qty: 60 TABLET | Refills: 0 | Status: SHIPPED | OUTPATIENT
Start: 2018-08-09 | End: 2018-08-17

## 2018-08-09 RX ORDER — HYDROCODONE BITARTRATE AND ACETAMINOPHEN 5; 325 MG/1; MG/1
1 TABLET ORAL EVERY 6 HOURS PRN
Qty: 20 TABLET | Refills: 0 | Status: SHIPPED | OUTPATIENT
Start: 2018-08-09

## 2018-08-09 RX ORDER — FENTANYL CITRATE 50 UG/ML
25-50 INJECTION, SOLUTION INTRAMUSCULAR; INTRAVENOUS
Status: CANCELLED | OUTPATIENT
Start: 2018-08-09

## 2018-08-09 RX ORDER — PROPOFOL 10 MG/ML
INJECTION, EMULSION INTRAVENOUS PRN
Status: DISCONTINUED | OUTPATIENT
Start: 2018-08-09 | End: 2018-08-09

## 2018-08-09 RX ORDER — NALOXONE HYDROCHLORIDE 0.4 MG/ML
.1-.4 INJECTION, SOLUTION INTRAMUSCULAR; INTRAVENOUS; SUBCUTANEOUS
Status: CANCELLED | OUTPATIENT
Start: 2018-08-09 | End: 2018-08-10

## 2018-08-09 RX ORDER — SODIUM CHLORIDE, SODIUM LACTATE, POTASSIUM CHLORIDE, CALCIUM CHLORIDE 600; 310; 30; 20 MG/100ML; MG/100ML; MG/100ML; MG/100ML
INJECTION, SOLUTION INTRAVENOUS CONTINUOUS
Status: CANCELLED | OUTPATIENT
Start: 2018-08-09

## 2018-08-09 RX ADMIN — FENTANYL CITRATE 50 MCG: 50 INJECTION INTRAMUSCULAR; INTRAVENOUS at 13:04

## 2018-08-09 RX ADMIN — PROPOFOL 70 MG: 10 INJECTION, EMULSION INTRAVENOUS at 13:13

## 2018-08-09 RX ADMIN — LIDOCAINE HYDROCHLORIDE 40 MG: 20 INJECTION, SOLUTION INFILTRATION; PERINEURAL at 13:13

## 2018-08-09 RX ADMIN — SODIUM CHLORIDE, POTASSIUM CHLORIDE, SODIUM LACTATE AND CALCIUM CHLORIDE: 600; 310; 30; 20 INJECTION, SOLUTION INTRAVENOUS at 13:07

## 2018-08-09 ASSESSMENT — ENCOUNTER SYMPTOMS
DIFFICULTY URINATING: 0
ARTHRALGIAS: 0
SHORTNESS OF BREATH: 0
COLOR CHANGE: 0
FEVER: 0
CONFUSION: 0
NECK STIFFNESS: 0
HEADACHES: 0
EYE REDNESS: 0
ABDOMINAL PAIN: 0

## 2018-08-09 NOTE — ANESTHESIA PREPROCEDURE EVALUATION
Anesthesia Evaluation     . Pt has had prior anesthetic. Type: General (had a long wake up one time but has been fine the last few anesthetics she has had)           ROS/MED HX    ENT/Pulmonary:     (+)MONICA risk factors snores loudly, obese, , . .    Neurologic:  - neg neurologic ROS     Cardiovascular:     (+) hypertension----. : . . . :. .       METS/Exercise Tolerance:  3 - Able to walk 1-2 blocks without stopping   Hematologic:  - neg hematologic  ROS       Musculoskeletal: Comment: Dislocated right shoulder          GI/Hepatic:  - neg GI/hepatic ROS       Renal/Genitourinary:  - ROS Renal section negative       Endo:         Psychiatric:  - neg psychiatric ROS       Infectious Disease:  - neg infectious disease ROS       Malignancy:      - no malignancy   Other:    - neg other ROS                 Physical Exam  Normal systems: dental    Airway   Mallampati: II  TM distance: >3 FB  Neck ROM: full    Dental     Cardiovascular   Rhythm and rate: regular and normal      Pulmonary    breath sounds clear to auscultation                    Anesthesia Plan      History & Physical Review      ASA Status:  2 emergent.    NPO Status:  > 8 hours    Plan for MAC with Propofol induction.   PONV prophylaxis:  Ondansetron (or other 5HT-3) and Dexamethasone or Solumedrol       Postoperative Care  Postoperative pain management:  IV analgesics.      Consents  Anesthetic plan, risks, benefits and alternatives discussed with:  Patient and Spouse..                          .

## 2018-08-09 NOTE — ED AVS SNAPSHOT
[facility]    [address]    [place] [address]    Phone:  [phone]    Fax:  [phone]                                       Lavonne Barrios   MRN: [id number]    Department:  [facility]   Date of Visit:  8/9/2018           After Visit Summary Signature Page     I have received my discharge instructions, and my questions have been answered. I have discussed any challenges I see with this plan with the nurse or doctor.    ..........................................................................................................................................  Patient/Patient Representative Signature      ..........................................................................................................................................  Patient Representative Print Name and Relationship to Patient    ..................................................               ................................................  Date                                            Time    ..........................................................................................................................................  Reviewed by Signature/Title    ...................................................              ..............................................  Date                                                            Time

## 2018-08-09 NOTE — ED NOTES
Portable xray of shoulder completed at bedside.  Pt awake and alert.  Following directions.  Shoulder confirmed to be reduced per SACHA Storey.  VSS.  Pt drinking water.  Swallowing without difficulty.  Shoulder immobilizer placed on patient.

## 2018-08-09 NOTE — ED TRIAGE NOTES
"ED Nursing Triage Note (General)   ________________________________    Lavonne BRANDI Barrios is a 62 year old Female that presents to triage private car  With history of  Was at work and slipped on wet spot, when she was falling attempted to catch self with right arm, landed on knees which feel ok, but felt a \"pull\" of right shoulder.  States she cannot move it, when palpated, does not feel as if it is out of place, reported by patient   Significant symptoms had onset 1 hour(s) ago.  /75  Pulse 71  Temp 98.2  F (36.8  C)  Resp 12  Ht 1.702 m (5' 7\")  SpO2 96%  Breastfeeding? Not  Patient appears alert , in moderate distress., and cooperative behavior.  Anxiety: upset and crying  GCS 15  Airway: intact  Breathing noted as Normal.  Circulation Normal  Skin normal  Action taken:  Sling and ice applied and to WR until room available.      PRE HOSPITAL PRIOR LIVING SITUATION Spouse    COLUMBIA-SUICIDE SEVERITY RATING SCALE   Screen with Triage Points for Emergency Department      Ask questions that are bolded and underlined.   Past  month   Ask Questions 1 and 2 YES NO   1)  Have you wished you were dead or wished you could go to sleep and not wake up?   x   2)  Have you actually had any thoughts of killing yourself?   x   If YES to 2, ask questions 3, 4, 5, and 6.  If NO to 2, go directly to question 6.   3)  Have you been thinking about how you might do this?   E.g.  I thought about taking an overdose but I never made a specific plan as to when where or how I would actually do it .and I would never go through with it.       4)  Have you had these thoughts and had some intention of acting on them?   As opposed to  I have the thoughts but I definitely will not do anything about them.       5)  Have you started to work out or worked out the details of how to kill yourself? Do you intend to carry out this plan?      6)  Have you ever done anything, started to do anything, or prepared to do anything to end your " life?  Examples: Collected pills, obtained a gun, gave away valuables, wrote a will or suicide note, took out pills but didn t swallow any, held a gun but changed your mind or it was grabbed from your hand, went to the roof but didn t jump; or actually took pills, tried to shoot yourself, cut yourself, tried to hang yourself, etc.    If YES, ask: Was this within the past three months?  Lifetime     x    Past 3 Months        Item 1:  Behavioral Health Referral at Discharge  Item 2:  Behavioral Health Referral at Discharge   Item 3:  Behavioral Health Consult (Psychiatric Nurse/) and consider Patient Safety Precautions  Item 4:  Immediate Notification of Physician and/or Behavioral Health and Patient Safety Precautions   Item 5:  Immediate Notification of Physician and/or Behavioral Health and Patient Safety Precautions  Item 6:  Over 3 months ago: Behavioral Health Consult (Psychiatric Nurse/) and consider Patient Safety Precautions  OR  Item 6:  3 months ago or less: Immediate Notification of Physician and/or Behavioral Health and Patient Safety Precautions

## 2018-08-09 NOTE — DISCHARGE INSTRUCTIONS
Dislocations (Shoulder, Knee Cap, Elbow, Finger)  A joint is the place where your bones come together. Normally, bones glide smoothly within your joints, allowing a wide range of motion. But a bone can be pushed or pulled out of position. This is known as a dislocation. Dislocation prevents normal joint movement and can be very painful. Prompt treatment is crucial.     An X-ray of a dislocated shoulder joint.   Causes of dislocations  Dislocations can happen to almost any joint. But they're most common in the shoulder, knee cap, elbow, and finger. Dislocated elbows happen most often in children. Many dislocations result from trauma, such as a blow or fall. But some can happen during normal activities. A shoulder can dislocate during the act of throwing a ball.  When to go to the Emergency Room (ER)  A dislocation needs emergency care. Injuries that aren`t treated promptly take longer to heal and may result in lasting damage to the joint. Seek medical help right away if you:    Have severe pain in a joint.    Can't move the joint normally.    Can see the misplaced bone.    Have numbness or tingling.    Have a break in the skin over the painful joint.  What to expect in the ER    You will be given pain medicine to make you more comfortable.    The joint will be examined and an X-ray may be taken to check for fractures or other injuries.    The joint is put back into place.    A dislocated finger or elbow may be splinted to keep it from moving while it heals. An injured shoulder may be placed in a sling.    A second X-ray may be done before you leave the hospital.    In some cases, you may be referred to a bone specialist (orthopaedist) or a primary care sports medicine healthcare provider. He or she will make sure you heal properly.  Reduce swelling and pain due to a dislocation  Tips to reduce swelling and pain:     Apply ice to the joint (keep a thin cloth between the ice and your skin).    Raise the injured area  above heart level if you can.   Date Last Reviewed: 9/30/2015 2000-2017 [employer]. [address] [place] [address], [place] [address]. All rights reserved. This information is not intended as a substitute for professional medical care. Always follow your healthcare professional's instructions.          Rotator Cuff Tear  The rotator cuff is a group of muscles and tendons that surround the shoulder joint. These muscles and tendons hold the arm in its joint. They also help the shoulder to rotate. The rotator cuff can be torn from overuse or injury. Gradual wear and tear can lead to inflammation of these tendons. This can progress to gradual or sudden tears.  Symptoms of a torn rotator cuff include:    Shoulder pain that gets worse when you raise your arm overhead    Weakness of the shoulder muscles with overhead activity    Popping and clicking when you move your shoulder    Shoulder pain that wakes you up at night when sleeping on the hurt shoulder  Diagnosis is made by an MRI or arthroscopy. This is a surgical procedure to look inside the joint through a small tube. Partial rotator cuff tears can be treated by first resting, then strengthening the rotator cuff muscles.  Anti-inflammatory medicines, such as ibuprofen or naproxen, are useful. A limited number of steroid injections can be given. Surgery may be recommended for complete tears and partial tears that do not respond to medical treatment.  Home care    Avoid activities that make your pain worse. This includes overhead activities, doing the same motion over and over, and heavy lifting.    You may use over-the-counter pain medicines to control pain, unless another medicine was prescribed. If you have chronic liver or kidney disease or ever had a stomach ulcer or GI bleeding, talk with your healthcare provider before using these medicines.    If you were given a sling, use it for comfort. After your pain decreases, don t keep your arm in the sling all  the time. Take your arm out several times a day and move the shoulder joint, as you are able.    Your healthcare provider may recommend gentle pendulum exercises. Stand or sit with your arm vertical and close to your side. Relax your shoulder muscles and gently swing the arm forward and back, side to side, and in small circles for about 5 minutes. Do this once or twice a day. There should be only slight pain with this exercise.    You may benefit from physical therapy or a home exercise program to strengthen your shoulder muscles. This will also increase your pain-free range of motion. Applying heat prior to exercises can help prepare the muscles and joint for activity. Talk to your healthcare provider about what is best for your condition.  Follow-up care  Follow up with your healthcare provider, or as advised.  When to seek medical advice  Call your healthcare provider right away if any of the following occur:    Increasing shoulder pain    Rapid swelling in the involved shoulder or arm    Numbness, tingling, or pain radiating down the arm to the hand    Loss of strength in the affected arm  Date Last Reviewed: 11/23/2015 2000-2017 [employer]. [address] [place] [address]. All rights reserved. This information is not intended as a substitute for professional medical care. Always follow your healthcare professional's instructions.          Dislocation: Shoulder (Reduced)    A shoulder is dislocated when a strong force injures, and possibly tears the ligaments that hold the shoulder joint together. The bones that make up the joint then move apart and become stuck out of place. The joint must be put back in place. Then it will take several weeks for the shoulder to heal. This injury may weaken the ligaments. Weakened ligaments put you at risk for another dislocation. Another dislocation can happen even if you are hit with less force.  Shoulder dislocation is treated with a special type  of arm sling called a shoulder immobilizer. This keeps your arm close to your body. This stops the shoulder from dislocating again while the ligaments heal. After a few weeks, you may start an exercise program. This will gradually bring back your range-of-motion and shoulder strength. It will also lower your risk for another dislocation.  Home care  Follow these tips for taking care of yourself at home:    Until your next doctor visit, wear your shoulder immobilizer at all times. Don t take it off at night to sleep. This is because it s possible to dislocate your arm again in your sleep. You can take it off to bathe or dress. But don t move your arm away from your body. Keep your arm in the same position that the sling was holding it in until you put the sling back on. During your next visit, ask your doctor how long you should wear the sling.    Apply an ice pack over the injured area for 20 minutes every 1 to 2 hours the first day. You can make an ice pack by putting ice cubes in a plastic bag. Wrap the bag in a towel before putting it on your shoulder. Continue with ice packs 3 to 4 times a day for the next 2 to 3 days. Then use the ice as needed to relieve pain and swelling.    You may use acetaminophen or ibuprofen to control pain, unless another pain medicine was prescribed. If you have chronic liver or kidney disease or ever had a stomach ulcer or gastrointestinal bleeding, talk with your doctor before using these medicines.    Don t take part in sports or physical education classes until your doctor says it s OK.  Follow-up care  Follow up with your healthcare provider, or as advised. You shouldn t wear your shoulder immobilizer or sling for more than a few weeks without taking it off. Keeping it on for a longer time may limit your range-of-motion at the shoulder joint. If you have had several dislocations of the same shoulder, you may have permanent damage to the ligaments. Ask an orthopedic doctor about  surgery to prevent another dislocation.  When to seek medical advice  Call your healthcare provider right away if any of these occur:    Another dislocation of your shoulder    Swelling or pain in the shoulder or arm that gets worse    Your fingers become cold, blue, numb or tingly    Fever or chills  Date Last Reviewed: 8/2/2016 2000-2017 [employer]. [address] [place] [address]. All rights reserved. This information is not intended as a substitute for professional medical care. Always follow your healthcare professional's instructions.

## 2018-08-09 NOTE — ANESTHESIA CARE TRANSFER NOTE
Patient: Lavonne Barrios    * No procedures listed *    Diagnosis: * No pre-op diagnosis entered *  Diagnosis Additional Information: No value filed.    Anesthesia Type:   MAC     Note:  Airway :Room Air  Patient transferred to:Emergency Department  Handoff Report: Identifed the Patient, Identified the Reponsible Provider, Reviewed the pertinent medical history, Discussed the surgical course, Reviewed Intra-OP anesthesia mangement and issues during anesthesia, Set expectations for post-procedure period and Allowed opportunity for questions and acknowledgement of understanding      Vitals: (Last set prior to Anesthesia Care Transfer)    CRNA VITALS  8/9/2018 1248 - 8/9/2018 1319      8/9/2018             SpO2: 98 %    EKG: NSR                Electronically Signed By: BONG AUSTIN CRNA  August 9, 2018  1:19 PM

## 2018-08-09 NOTE — ED AVS SNAPSHOT
[facility]    [email or web address]    [place] [address]    Phone:  [phone]    Fax:  [phone]                                       Lavonne Barrios   MRN: [id number]    Department:  [facility]   Date of Visit:  8/9/2018           Patient Information     Date Of Birth          1955        Your diagnoses for this visit were:     Work related injury     Shoulder dislocation, right, initial encounter     Rotator cuff injury, right, initial encounter     Hill Sachs deformity, right        You were seen by Markell Monteiro PA-C.      Follow-up Information     Follow up with Adam Kaufman MD. Schedule an appointment as soon as possible for a visit in 1 week.    Specialty:  Orthopedics    Contact information:    [facility] [address]  [place] [address]  [phone]          Discharge Instructions         Dislocations (Shoulder, Knee Cap, Elbow, Finger)  A joint is the place where your bones come together. Normally, bones glide smoothly within your joints, allowing a wide range of motion. But a bone can be pushed or pulled out of position. This is known as a dislocation. Dislocation prevents normal joint movement and can be very painful. Prompt treatment is crucial.     An X-ray of a dislocated shoulder joint.   Causes of dislocations  Dislocations can happen to almost any joint. But they're most common in the shoulder, knee cap, elbow, and finger. Dislocated elbows happen most often in children. Many dislocations result from trauma, such as a blow or fall. But some can happen during normal activities. A shoulder can dislocate during the act of throwing a ball.  When to go to the Emergency Room (ER)  A dislocation needs emergency care. Injuries that aren`t treated promptly take longer to heal and may result in lasting damage to the joint. Seek medical help right away if you:    Have severe pain in a joint.    Can't move the joint normally.    Can see  the misplaced bone.    Have numbness or tingling.    Have a break in the skin over the painful joint.  What to expect in the ER    You will be given pain medicine to make you more comfortable.    The joint will be examined and an X-ray may be taken to check for fractures or other injuries.    The joint is put back into place.    A dislocated finger or elbow may be splinted to keep it from moving while it heals. An injured shoulder may be placed in a sling.    A second X-ray may be done before you leave the hospital.    In some cases, you may be referred to a bone specialist (orthopaedist) or a primary care sports medicine healthcare provider. He or she will make sure you heal properly.  Reduce swelling and pain due to a dislocation  Tips to reduce swelling and pain:     Apply ice to the joint (keep a thin cloth between the ice and your skin).    Raise the injured area above heart level if you can.   Date Last Reviewed: 9/30/2015 2000-2017 [employer]. [address] [place] [address]. All rights reserved. This information is not intended as a substitute for professional medical care. Always follow your healthcare professional's instructions.          Rotator Cuff Tear  The rotator cuff is a group of muscles and tendons that surround the shoulder joint. These muscles and tendons hold the arm in its joint. They also help the shoulder to rotate. The rotator cuff can be torn from overuse or injury. Gradual wear and tear can lead to inflammation of these tendons. This can progress to gradual or sudden tears.  Symptoms of a torn rotator cuff include:    Shoulder pain that gets worse when you raise your arm overhead    Weakness of the shoulder muscles with overhead activity    Popping and clicking when you move your shoulder    Shoulder pain that wakes you up at night when sleeping on the hurt shoulder  Diagnosis is made by an MRI or arthroscopy. This is a surgical procedure to look inside the  joint through a small tube. Partial rotator cuff tears can be treated by first resting, then strengthening the rotator cuff muscles.  Anti-inflammatory medicines, such as ibuprofen or naproxen, are useful. A limited number of steroid injections can be given. Surgery may be recommended for complete tears and partial tears that do not respond to medical treatment.  Home care    Avoid activities that make your pain worse. This includes overhead activities, doing the same motion over and over, and heavy lifting.    You may use over-the-counter pain medicines to control pain, unless another medicine was prescribed. If you have chronic liver or kidney disease or ever had a stomach ulcer or GI bleeding, talk with your healthcare provider before using these medicines.    If you were given a sling, use it for comfort. After your pain decreases, don t keep your arm in the sling all the time. Take your arm out several times a day and move the shoulder joint, as you are able.    Your healthcare provider may recommend gentle pendulum exercises. Stand or sit with your arm vertical and close to your side. Relax your shoulder muscles and gently swing the arm forward and back, side to side, and in small circles for about 5 minutes. Do this once or twice a day. There should be only slight pain with this exercise.    You may benefit from physical therapy or a home exercise program to strengthen your shoulder muscles. This will also increase your pain-free range of motion. Applying heat prior to exercises can help prepare the muscles and joint for activity. Talk to your healthcare provider about what is best for your condition.  Follow-up care  Follow up with your healthcare provider, or as advised.  When to seek medical advice  Call your healthcare provider right away if any of the following occur:    Increasing shoulder pain    Rapid swelling in the involved shoulder or arm    Numbness, tingling, or pain radiating down the arm to the  hand    Loss of strength in the affected arm  Date Last Reviewed: 11/23/2015 2000-2017 [employer]. [address] [place] [address], [place] [address]. All rights reserved. This information is not intended as a substitute for professional medical care. Always follow your healthcare professional's instructions.          Dislocation: Shoulder (Reduced)    A shoulder is dislocated when a strong force injures, and possibly tears the ligaments that hold the shoulder joint together. The bones that make up the joint then move apart and become stuck out of place. The joint must be put back in place. Then it will take several weeks for the shoulder to heal. This injury may weaken the ligaments. Weakened ligaments put you at risk for another dislocation. Another dislocation can happen even if you are hit with less force.  Shoulder dislocation is treated with a special type of arm sling called a shoulder immobilizer. This keeps your arm close to your body. This stops the shoulder from dislocating again while the ligaments heal. After a few weeks, you may start an exercise program. This will gradually bring back your range-of-motion and shoulder strength. It will also lower your risk for another dislocation.  Home care  Follow these tips for taking care of yourself at home:    Until your next doctor visit, wear your shoulder immobilizer at all times. Don t take it off at night to sleep. This is because it s possible to dislocate your arm again in your sleep. You can take it off to bathe or dress. But don t move your arm away from your body. Keep your arm in the same position that the sling was holding it in until you put the sling back on. During your next visit, ask your doctor how long you should wear the sling.    Apply an ice pack over the injured area for 20 minutes every 1 to 2 hours the first day. You can make an ice pack by putting ice cubes in a plastic bag. Wrap the bag in a towel before putting it on your  shoulder. Continue with ice packs 3 to 4 times a day for the next 2 to 3 days. Then use the ice as needed to relieve pain and swelling.    You may use acetaminophen or ibuprofen to control pain, unless another pain medicine was prescribed. If you have chronic liver or kidney disease or ever had a stomach ulcer or gastrointestinal bleeding, talk with your doctor before using these medicines.    Don t take part in sports or physical education classes until your doctor says it s OK.  Follow-up care  Follow up with your healthcare provider, or as advised. You shouldn t wear your shoulder immobilizer or sling for more than a few weeks without taking it off. Keeping it on for a longer time may limit your range-of-motion at the shoulder joint. If you have had several dislocations of the same shoulder, you may have permanent damage to the ligaments. Ask an orthopedic doctor about surgery to prevent another dislocation.  When to seek medical advice  Call your healthcare provider right away if any of these occur:    Another dislocation of your shoulder    Swelling or pain in the shoulder or arm that gets worse    Your fingers become cold, blue, numb or tingly    Fever or chills  Date Last Reviewed: 8/2/2016 2000-2017 The [employer]. [address] [place] [address]. All rights reserved. This information is not intended as a substitute for professional medical care. Always follow your healthcare professional's instructions.          24 Hour Appointment Hotline     To schedule an appointment at [facility], please call [phone]. If you don't have a family doctor or clinic, we will help you find one. [facility] clinics are conveniently located to serve the needs of you and your family.        ED Discharge Orders     ORTHOPEDICS ADULT REFERRAL       Your provider has referred you to:Dr. Kaufman      Please be aware that coverage of these services is subject to the terms and limitations of your health  insurance plan.  Call member services at your health plan with any benefit or coverage questions.      Please bring the following to your appointment:    >>   Any x-rays, CTs or MRIs which have been performed.  Contact the facility where they were done to arrange for  prior to your scheduled appointment.    >>   List of current medications   >>   This referral request   >>   Any documents/labs given to you for this referral                     Review of your medicines      START taking        Dose / Directions Last dose taken    HYDROcodone-acetaminophen 5-325 MG per tablet   Commonly known as:  NORCO   Dose:  1 tablet   Quantity:  20 tablet        Take 1 tablet by mouth every 6 hours as needed for severe pain   Refills:  0        ibuprofen 800 MG tablet   Commonly known as:  ADVIL/MOTRIN   Dose:  800 mg   Quantity:  60 tablet        Take 1 tablet (800 mg) by mouth every 8 hours as needed for moderate pain   Refills:  0          Our records show that you are taking the medicines listed below. If these are incorrect, please call your family doctor or clinic.        Dose / Directions Last dose taken    atorvastatin 80 MG tablet   Commonly known as:  LIPITOR   Quantity:  90 tablet        TAKE ONE TABLET BY MOUTH ONCE DAILY   Refills:  1        oxybutynin 5 MG 24 hr tablet   Commonly known as:  DITROPAN-XL   Quantity:  30 tablet        TAKE ONE TABLET BY MOUTH ONCE DAILY   Refills:  0                Information about OPIOIDS     PRESCRIPTION OPIOIDS: WHAT YOU NEED TO KNOW   We gave you an opioid (narcotic) pain medicine. It is important to manage your pain, but opioids are not always the best choice. You should first try all the other options your care team gave you. Take this medicine for as short a time (and as few doses) as possible.    Some activities can increase your pain, such as bandage changes or therapy sessions. It may help to take your pain medicine 30 to 60 minutes before these activities. Reduce  your stress by getting enough sleep, working on hobbies you enjoy and practicing relaxation or meditation. Talk to your care team about ways to manage your pain beyond prescription opioids.    These medicines have risks:    DO NOT drive when on new or higher doses of pain medicine. These medicines can affect your alertness and reaction times, and you could be arrested for driving under the influence (DUI). If you need to use opioids long-term, talk to your care team about driving.    DO NOT operate heavy machinery    DO NOT do any other dangerous activities while taking these medicines.    DO NOT drink any alcohol while taking these medicines.     If the opioid prescribed includes acetaminophen, DO NOT take with any other medicines that contain acetaminophen. Read all labels carefully. Look for the word  acetaminophen  or  Tylenol.  Ask your pharmacist if you have questions or are unsure.    You can get addicted to pain medicines, especially if you have a history of addiction (chemical, alcohol or substance dependence). Talk to your care team about ways to reduce this risk.    All opioids tend to cause constipation. Drink plenty of water and eat foods that have a lot of fiber, such as fruits, vegetables, prune juice, apple juice and high-fiber cereal. Take a laxative (Miralax, milk of magnesia, Colace, Senna) if you don t move your bowels at least every other day. Other side effects include upset stomach, sleepiness, dizziness, throwing up, tolerance (needing more of the medicine to have the same effect), physical dependence and slowed breathing.    Store your pills in a secure place, locked if possible. We will not replace any lost or stolen medicine. If you don t finish your medicine, please throw away (dispose) as directed by your pharmacist. The [place] [employer]ution [employer] has more information about safe disposal: [email or web address]://[email or web address].[email or web address]/[email or web address]-medications        Prescriptions  "were sent or printed at these locations (2 Prescriptions)                   [facility] [address] [place], [place] - [address] [address]    Telephone:  [phone]   Fax:  [phone]   Hours:                  Printed at Department/Unit printer (2 of 2)         HYDROcodone-acetaminophen (NORCO) 5-325 MG per tablet               ibuprofen (ADVIL/MOTRIN) 800 MG tablet                Procedures and tests performed during your visit     Procedure/Test Number of Times Performed    XR Shoulder Right 2 Views 2      Orders Needing Specimen Collection     None      Pending Results     No orders found from 8/7/2018 to 8/10/2018.            Pending Culture Results     No orders found from 8/7/2018 to 8/10/2018.            Pending Results Instructions     If you had any lab results that were not finalized at the time of your Discharge, you can call the ED Lab Result RN at [phone]. You will be contacted by this team for any positive Lab results or changes in treatment. The nurses are available 7 days a week from 10A to 6:30P.  You can leave a message 24 hours per day and they will return your call.        Thank you for choosing [facility]       Thank you for choosing [facility] for your care. Our goal is always to provide you with excellent care. Hearing back from our patients is one way we can continue to improve our services. Please take a few minutes to complete the written survey that you may receive in the mail after you visit with us. Thank you!        gridCommharRunMyProcess Information     [employer] lets you send messages to your doctor, view your test results, renew your prescriptions, schedule appointments and more. To sign up, go to [email or web address] . Click on \"Log in\" on the left side of the screen, which will take you to the Welcome page. Then click on \"Sign up Now\" on the right side of the page.     You will be asked to enter the access code listed below, as well as " some personal information. Please follow the directions to create your username and password.     Your access code is: [email or web address]  Expires: 2018  1:55 PM     Your access code will  in 90 days. If you need help or a new code, please call your [place] clinic or [phone].        Care EveryWhere ID     This is your Care EveryWhere ID. This could be used by other organizations to access your [place] medical records  [id number]        Equal Access to Services     LENNY KIRK : Shilpa herrera Soalena, waaxda luqadaha, qaybta kaalmada semaj, mila concepcion . So [facility] [phone].    ATENCIÓN: Si habla español, tiene a baeza disposición servicios gratuitos de asistencia lingüística. Llame al [phone].    We comply with applicable federal civil rights laws and [place] laws. We do not discriminate on the basis of race, color, national origin, age, disability, sex, sexual orientation, or gender identity.            After Visit Summary       This is your record. Keep this with you and show to your community pharmacist(s) and doctor(s) at your next visit.

## 2018-08-09 NOTE — ED PROVIDER NOTES
History     Chief Complaint   Patient presents with     Arm Injury     HPI Comments: This is a work-related injury.  This is a 62-year-old female who was working at [employer] she slipped on a wet spot landing on her outstretched right arm.  Think she may have pulled something in her shoulder because she has difficulty raising it without increased pain.  Otherwise she is right-hand dominant.  Denies any other injuries.    The history is provided by the patient.         Problem List:    Patient Active Problem List    Diagnosis Date Noted     Family history of melanoma 02/13/2018     Priority: Medium     Spondylolysis with spondylolisthesis 04/10/2017     Priority: Medium     Morbid obesity with BMI of 45.0-49.9, adult (H) 08/02/2016     Priority: Medium     Post-menopausal bleeding 07/30/2015     Priority: Medium     Overview:   Followed by Dr. Ball.         Tear of lateral cartilage or meniscus of knee, current 08/16/2012     Priority: Medium     Hypercholesterolemia 10/20/2010     Priority: Medium     Sciatica of left side 10/20/2010     Priority: Medium     Other staphylococcus infection in conditions classified elsewhere and of unspecified site 07/21/2010     Priority: Medium     Urinary incontinence, urge 07/19/2010     Priority: Medium        Past Medical History:    Past Medical History:   Diagnosis Date     Impingement syndrome of right shoulder      Morbid (severe) obesity due to excess calories (H)      Perianal venous thrombosis      Rash and other nonspecific skin eruption        Past Surgical History:    Past Surgical History:   Procedure Laterality Date     COLONOSCOPY      2007,Normal, Followup recommended in ten years.     LAPAROSCOPIC TUBAL LIGATION      1981     OTHER SURGICAL HISTORY      1988,,HERNIA REPAIR     OTHER SURGICAL HISTORY      10/08/2015,[id number],HYSTEROSCOPY W/ ENDOMETRIAL ABLATION     OTHER SURGICAL HISTORY      10/08/2015,[id number],HYSTEROSCOPY W/ POLYPECTOMY,frozen section  "pathology of endomertrium       Family History:    Family History   Problem Relation Age of Onset     HEART DISEASE Mother      Heart Disease     Cancer Father      Cancer,Melanoma, family hx     Pancreatic Cancer Other      Cancer-pancreatic,Family hx     Other - See Comments Daughter      MS     Breast Cancer No family hx of      Cancer-breast       Social History:  Marital Status:   [2]  Social History   Substance Use Topics     Smoking status: Never Smoker     Smokeless tobacco: Never Used     Alcohol use No      Comment: Alcoholic Drinks/day: occ camping        Medications:      atorvastatin (LIPITOR) 80 MG tablet   oxybutynin (DITROPAN-XL) 5 MG 24 hr tablet   [DISCONTINUED] atorvastatin (LIPITOR) 80 MG tablet         Review of Systems   Constitutional: Negative for fever.   HENT: Negative for congestion.    Eyes: Negative for redness.   Respiratory: Negative for shortness of breath.    Cardiovascular: Negative for chest pain.   Gastrointestinal: Negative for abdominal pain.   Genitourinary: Negative for difficulty urinating.   Musculoskeletal: Negative for arthralgias and neck stiffness.        Right arm injury and pain   Skin: Negative for color change.   Neurological: Negative for headaches.   Psychiatric/Behavioral: Negative for confusion.       Physical Exam   BP: 182/75  Pulse: 71  Temp: 98.2  F (36.8  C)  Resp: 12  Height: 170.2 cm (5' 7\")  SpO2: 96 %      Physical Exam   Constitutional: She is oriented to person, place, and time. No distress.   HENT:   Head: Atraumatic.   Mouth/Throat: Oropharynx is clear and moist. No oropharyngeal exudate.   Eyes: Pupils are equal, round, and reactive to light. No scleral icterus.   Cardiovascular: Normal heart sounds and intact distal pulses.    Pulmonary/Chest: Breath sounds normal. No respiratory distress.   Abdominal: Soft. Bowel sounds are normal. There is no tenderness.   Musculoskeletal: She exhibits tenderness. She exhibits no edema.   Tenderness to " "palpation over the proximal humerus.  Is unable to raise her arm due to pain.  Her elbow appears to be intact with good flexion extension.  The wrist as well has good motion CMS ×4.   Neurological: She is alert and oriented to person, place, and time.   Skin: Skin is warm. No rash noted. She is not diaphoretic.       ED Course     ED Course     Orthopedic injury tx  Date/Time: 8/9/2018 1:00 PM  Performed by: DIMAS SANTOYO  Authorized by: DIMAS SANTOYO   Consent: Verbal consent obtained. Written consent obtained.  Risks and benefits: risks, benefits and alternatives were discussed  Consent given by: patient  Patient understanding: patient states understanding of the procedure being performed  Patient consent: the patient's understanding of the procedure matches consent given  Procedure consent: procedure consent matches procedure scheduled  Relevant documents: relevant documents present and verified  Imaging studies: imaging studies available  Patient identity confirmed: verbally with patient, arm band and hospital-assigned identification number  Time out: Immediately prior to procedure a \"time out\" was called to verify the correct patient, procedure, equipment, support staff and site/side marked as required.  Injury location: shoulder  Location details: right shoulder  Injury type: dislocation  Dislocation type: anterior  Hill-Sachs deformity: yes  Chronicity: new  Pre-procedure distal perfusion: normal  Pre-procedure neurological function: normal  Pre-procedure range of motion: reduced    Anesthesia:  Local anesthesia used: no    Sedation:  Patient sedated: yes  Sedation type: moderate (conscious) sedation  Sedatives: propofol  Analgesia: fentanyl  Vitals: Vital signs were monitored during sedation.  Manipulation performed: yes  Reduction method: Milch technique and external rotation  Reduction successful: yes  X-ray confirmed reduction: yes  Immobilization: sling  Post-procedure neurovascular assessment: " post-procedure neurovascularly intact  Post-procedure distal perfusion: normal  Post-procedure neurological function: normal  Post-procedure range of motion: improved  Patient tolerance: Patient tolerated the procedure well with no immediate complications                    Results for orders placed or performed during the hospital encounter of 08/09/18 (from the past 24 hour(s))   XR Shoulder Right 2 Views    Narrative    PROCEDURE:  XR SHOULDER 2 VIEW RIGHT    HISTORY: trauma; .    COMPARISON:  None.    TECHNIQUE:  2 views right shoulder.    FINDINGS:  There is an anterior-inferior dislocation of the right  humerus. No definite fracture is seen although a Hill-Sachs deformity  is questioned on the axillary view. Mild acromioclavicular hypertrophy  is present. No right rib fracture is seen.      Impression    IMPRESSION: Anterior inferior right shoulder dislocation.      NUNO COFFEY MD   XR Shoulder Right 2 Views    Narrative    PROCEDURE:  XR SHOULDER 2 VIEW RIGHT    HISTORY: post shoulder reduction;     COMPARISON:  8/9/2018    TECHNIQUE:  2 views of the right shoulder were obtained.    FINDINGS:  There has been reduction of the anterior shoulder  dislocation. There is a questioned Hill-Sachs deformity of the humeral  head. Otherwise, no fractures seen on these portable views.       Impression    IMPRESSION: Reduction of the anterior shoulder dislocation.      ELOINA LARSON MD       Medications - No data to display    Assessments & Plan (with Medical Decision Making)     I have reviewed the nursing notes.    I have reviewed the findings, diagnosis, plan and need for follow up with the patient.      Discharge Medication List as of 8/9/2018  1:55 PM      START taking these medications    Details   HYDROcodone-acetaminophen (NORCO) 5-325 MG per tablet Take 1 tablet by mouth every 6 hours as needed for severe pain, Disp-20 tablet, R-0, Local Print      ibuprofen (ADVIL/MOTRIN) 800 MG tablet Take 1 tablet  (800 mg) by mouth every 8 hours as needed for moderate pain, Disp-60 tablet, R-0, Local Print             Final diagnoses:   Work related injury   Shoulder dislocation, right, initial encounter   Rotator cuff injury, right, initial encounter   Hill Sachs deformity, right     Afebrile.  Vital signs stable.  Work-related injury.  Right shoulder pain x-rays show right shoulder anterior inferior dislocation with Hill-Sachs deformity.  This was reduced as above successfully.  She was placed in a shoulder immobilizer.  I discussed close orthopedic evaluation and follow-up and referral made.  Referral to Dr. Kaufman in 5 days for further orthopedic evaluation and treatment.  Discussed RICE.  Rx for hydrocodone and [place].  Work note written.  Follow-up sooner if there are any other concerns problems or questions.  8/9/2018   [facility] AND [facility]     MikeMountain View Regional Medical CenterMarkell erickson PA-C  08/09/18 [id number]

## 2018-10-03 ENCOUNTER — HEALTH MAINTENANCE LETTER (OUTPATIENT)
Age: 63
End: 2018-10-03

## 2019-04-26 ENCOUNTER — HOSPITAL ENCOUNTER (OUTPATIENT)
Dept: MRI IMAGING | Facility: OTHER | Age: 64
Discharge: HOME | End: 2019-04-26
Attending: ORTHOPAEDIC SURGERY | Admitting: FAMILY MEDICINE
Payer: OTHER MISCELLANEOUS

## 2019-04-26 DIAGNOSIS — Z87.39 HISTORY OF CLOSED SHOULDER DISLOCATION: ICD-10-CM

## 2019-04-26 PROCEDURE — 73221 MRI JOINT UPR EXTREM W/O DYE: CPT | Mod: RT

## 2022-10-13 ENCOUNTER — OFFICE VISIT (OUTPATIENT)
Dept: FAMILY MEDICINE | Facility: OTHER | Age: 67
End: 2022-10-13
Attending: NURSE PRACTITIONER
Payer: COMMERCIAL

## 2022-10-13 VITALS
SYSTOLIC BLOOD PRESSURE: 144 MMHG | BODY MASS INDEX: 46.93 KG/M2 | TEMPERATURE: 98.2 F | HEIGHT: 66 IN | DIASTOLIC BLOOD PRESSURE: 84 MMHG | RESPIRATION RATE: 16 BRPM | WEIGHT: 292 LBS | HEART RATE: 67 BPM | OXYGEN SATURATION: 98 %

## 2022-10-13 DIAGNOSIS — Z20.828 EXPOSURE TO RESPIRATORY SYNCYTIAL VIRUS (RSV): ICD-10-CM

## 2022-10-13 DIAGNOSIS — R05.1 ACUTE COUGH: ICD-10-CM

## 2022-10-13 DIAGNOSIS — B33.8 RSV INFECTION: Primary | ICD-10-CM

## 2022-10-13 PROCEDURE — 99203 OFFICE O/P NEW LOW 30 MIN: CPT | Performed by: NURSE PRACTITIONER

## 2022-10-13 RX ORDER — CICLOPIROX OLAMINE 7.7 MG/G
CREAM TOPICAL
COMMUNITY
Start: 2022-10-11

## 2022-10-13 RX ORDER — MULTIPLE VITAMINS W/ MINERALS TAB 9MG-400MCG
1 TAB ORAL
COMMUNITY

## 2022-10-13 RX ORDER — ASPIRIN 81 MG/1
81 TABLET ORAL DAILY
COMMUNITY

## 2022-10-13 ASSESSMENT — PAIN SCALES - GENERAL: PAINLEVEL: NO PAIN (0)

## 2022-10-14 NOTE — PROGRESS NOTES
ASSESSMENT/PLAN:    I have reviewed the nursing notes.  I have reviewed the findings, diagnosis, plan and need for follow up with the patient.    1. RSV infection  2. Exposure to respiratory syncytial virus (RSV)  3. Acute cough  1 week out from symptom onset. Her  is currently hospitalized with RSV. Lavonne's exam is completely benign. She has a mild cough with stable vitals and feels ok overall. She is on the mend. I explained to her there is no indication to test for RSV at this time and that I strongly suspect she does have RSV. In theory, she is beyond the timeframe where she would have been most contagious. Continue frequent hand hygiene and cover her cough. She may return to work on next scheduled shift. Patient verbalized understanding, questions answered. Discussed that antibiotics are not indicated for RSV, a viral infection.     Discussed warning signs/symptoms indicative of need to f/u    Follow up if symptoms persist or worsen or concerns    I explained my diagnostic considerations and recommendations to the patient, who voiced understanding and agreement with the treatment plan. All questions were answered. We discussed potential side effects of any prescribed or recommended therapies, as well as expectations for response to treatments.    Ree Lopez NP  10/13/2022  7:48 PM    HPI:  Lavonne Barrios is a 67 year old female who presents to [facility] today for concerns of cough x1 week.  is in the hospital right now for RSV. She just thought she had cold symptoms. No shortness of breath. No fevers. The cough is mostly dry but every once in a while she will have some phlegm / sputum production. Does not feel unwell at all.     Works at [employer].     Niece just had a baby and Lavonne touched the baby today so she is concerned.     She has had several negative covid tests.     ROS otherwise negative.     Past Medical History:   Diagnosis Date     Impingement syndrome of right shoulder     No  "Comments Provided     Morbid (severe) obesity due to excess calories (H)     BMI 42.5, based off height 67 inches, abdominal girth greater than 35. patient was counseled on obesity.     Perianal venous thrombosis     2003     Rash and other nonspecific skin eruption     Recurrent     Past Surgical History:   Procedure Laterality Date     COLONOSCOPY      2007,Normal, Followup recommended in ten years.     LAPAROSCOPIC TUBAL LIGATION      1981     OTHER SURGICAL HISTORY      1988,,HERNIA REPAIR     OTHER SURGICAL HISTORY      10/08/2015,[id number],HYSTEROSCOPY W/ ENDOMETRIAL ABLATION     OTHER SURGICAL HISTORY      10/08/2015,[id number],HYSTEROSCOPY W/ POLYPECTOMY,frozen section pathology of endomertrium     Social History     Tobacco Use     Smoking status: Never     Smokeless tobacco: Never   Substance Use Topics     Alcohol use: No     Alcohol/week: 0.0 standard drinks     Comment: Alcoholic Drinks/day: occ camping     Current Outpatient Medications   Medication Sig Dispense Refill     aspirin 81 MG EC tablet Take 81 mg by mouth daily       atorvastatin (LIPITOR) 80 MG tablet TAKE ONE TABLET BY MOUTH ONCE DAILY 90 tablet 1     cholecalciferol 125 MCG (5000 UT) CAPS Take 5,000 Units by mouth       ciclopirox (LOPROX) 0.77 % cream        HYDROcodone-acetaminophen (NORCO) 5-325 MG per tablet Take 1 tablet by mouth every 6 hours as needed for severe pain 20 tablet 0     multivitamin w/minerals (THERA-VIT-M) tablet Take 1 tablet by mouth       oxybutynin (DITROPAN-XL) 5 MG 24 hr tablet TAKE ONE TABLET BY MOUTH ONCE DAILY 30 tablet 0     Turmeric Curcumin 500 MG CAPS        No Known Allergies  Past medical history, past surgical history, current medications and allergies reviewed and accurate to the best of my knowledge.      ROS:  Refer to HPI    BP (!) 144/84 (BP Location: Right arm, Patient Position: Sitting, Cuff Size: Adult Large)   Pulse 67   Temp 98.2  F (36.8  C) (Tympanic)   Resp 16   Ht 1.676 m (5' 6\")   Wt " 132.5 kg (292 lb)   SpO2 98%   BMI 47.13 kg/m      EXAM:  General Appearance: Well appearing 67 year old female, appropriate appearance for age. No acute distress   Ears: Left TM intact, translucent with bony landmarks appreciated, no erythema, no effusion, no bulging, no purulence.  Right TM intact, translucent with bony landmarks appreciated, no erythema, no effusion, no bulging, no purulence.  Left auditory canal clear.  Right auditory canal clear.  Normal external ears, non tender.  Eyes: conjunctivae normal without erythema or irritation, corneas clear, no drainage or crusting, no eyelid swelling, pupils equal   Oropharynx: moist mucous membranes, posterior pharynx without erythema, tonsils symmetric, no erythema, no exudates or petechiae, no post nasal drip seen, no trismus, voice clear.    Sinuses:  No sinus tenderness upon palpation of the frontal or maxillary sinuses  Nose:  Bilateral nares: no erythema, no edema, no drainage or congestion   Neck: supple without adenopathy  Respiratory: normal chest wall and respirations.  Normal effort.  Clear to auscultation bilaterally, no wheezing, crackles or rhonchi.  No increased work of breathing.  + rare nonproductive cough.  Cardiac: RRR with no murmurs  Psychological: normal affect, alert, oriented, and pleasant.

## 2022-10-14 NOTE — NURSING NOTE
Chief Complaint   Patient presents with     Cough     X 1 week+     Patient in clinic for cough x 1 + week. Patient's  is in the hospital right now for RSV.     Advanced Care Planning on file? No     Medication Review Completed: complete    FOOD SECURITY SCREENING QUESTIONS:    The next two questions are to help us understand your food security.  If you are feeling you need any assistance in this area, we have resources available to support you today.    Hunger Vital Signs:  Within the past 12 months we worried whether our food would run out before we got money to buy more. Never  Within the past 12 months the food we bought just didn't last and we didn't have money to get more. Never    Janey Milton LPN

## 2022-10-14 NOTE — PATIENT INSTRUCTIONS
See attached information on RSV.     I suspect you do indeed have RSV, as it is in household.     You are about 7 days out from symptom onset, so in theory should be less likely to be super contagious. Most contagious in 3-5 days of symptom onset.     You look good, vitals stable. Lungs are clear.     We discussed testing does not change things. Antibiotics are not indicated for viral infections such as RSV.

## 2022-11-11 ENCOUNTER — TRANSFERRED RECORDS (OUTPATIENT)
Dept: MULTI SPECIALTY CLINIC | Facility: CLINIC | Age: 67
End: 2022-11-11

## 2022-11-11 LAB — COLOGUARD-ABSTRACT: NEGATIVE

## 2024-08-29 ENCOUNTER — TRANSFERRED RECORDS (OUTPATIENT)
Dept: FAMILY MEDICINE | Facility: OTHER | Age: 69
End: 2024-08-29
Payer: COMMERCIAL

## 2024-08-29 VITALS — SYSTOLIC BLOOD PRESSURE: 124 MMHG | DIASTOLIC BLOOD PRESSURE: 70 MMHG

## 2024-08-29 NOTE — PROGRESS NOTES
Patient Quality Outreach    Patient is due for the following:   Hypertension -  BP check    Next Steps:   No follow up needed at this time.    Type of outreach:    Chart review performed, no outreach needed. Blood pressure of 124/70 noted at last office visit at [facility]. Flowsheets updated.    Questions for provider review:    None           Lynn Dial RN

## 2024-10-26 ENCOUNTER — OFFICE VISIT (OUTPATIENT)
Dept: FAMILY MEDICINE | Facility: OTHER | Age: 69
End: 2024-10-26
Attending: NURSE PRACTITIONER
Payer: COMMERCIAL

## 2024-10-26 VITALS
TEMPERATURE: 98.3 F | DIASTOLIC BLOOD PRESSURE: 84 MMHG | RESPIRATION RATE: 16 BRPM | OXYGEN SATURATION: 98 % | WEIGHT: 262.4 LBS | HEART RATE: 66 BPM | SYSTOLIC BLOOD PRESSURE: 132 MMHG | HEIGHT: 66 IN | BODY MASS INDEX: 42.17 KG/M2

## 2024-10-26 DIAGNOSIS — H65.03 NON-RECURRENT ACUTE SEROUS OTITIS MEDIA OF BOTH EARS: Primary | ICD-10-CM

## 2024-10-26 PROCEDURE — 99213 OFFICE O/P EST LOW 20 MIN: CPT | Performed by: STUDENT IN AN ORGANIZED HEALTH CARE EDUCATION/TRAINING PROGRAM

## 2024-10-26 RX ORDER — AMOXICILLIN 875 MG/1
875 TABLET, COATED ORAL 2 TIMES DAILY
Qty: 14 TABLET | Refills: 0 | Status: SHIPPED | OUTPATIENT
Start: 2024-10-26 | End: 2024-11-02

## 2024-10-26 ASSESSMENT — PAIN SCALES - GENERAL: PAINLEVEL_OUTOF10: NO PAIN (0)

## 2024-10-26 NOTE — NURSING NOTE
"Chief Complaint   Patient presents with    Ear Problem     Bilateral Ear Pain  Left Ear x 3-4 Days  Right Ear- Started today        Initial /84 (BP Location: Left arm, Patient Position: Chair, Cuff Size: Adult Large)   Pulse 66   Temp 98.3  F (36.8  C) (Temporal)   Resp 16   Ht 1.676 m (5' 6\")   Wt 119 kg (262 lb 6.4 oz)   SpO2 98%   Breastfeeding No   BMI 42.35 kg/m   Estimated body mass index is 42.35 kg/m  as calculated from the following:    Height as of this encounter: 1.676 m (5' 6\").    Weight as of this encounter: 119 kg (262 lb 6.4 oz).      Medication Reconciliation: Complete.       Pat Beauchamp LPN on 10/26/2024 at 2:03 PM     "

## (undated) RX ORDER — FENTANYL CITRATE 50 UG/ML
INJECTION, SOLUTION INTRAMUSCULAR; INTRAVENOUS
Status: DISPENSED
Start: 2018-08-09

## (undated) RX ORDER — SODIUM CHLORIDE, SODIUM LACTATE, POTASSIUM CHLORIDE, CALCIUM CHLORIDE 600; 310; 30; 20 MG/100ML; MG/100ML; MG/100ML; MG/100ML
INJECTION, SOLUTION INTRAVENOUS
Status: DISPENSED
Start: 2018-08-09